# Patient Record
Sex: MALE | Race: WHITE | NOT HISPANIC OR LATINO | Employment: FULL TIME | URBAN - METROPOLITAN AREA
[De-identification: names, ages, dates, MRNs, and addresses within clinical notes are randomized per-mention and may not be internally consistent; named-entity substitution may affect disease eponyms.]

---

## 2024-01-19 NOTE — PATIENT INSTRUCTIONS
Health Maintenance Due   Topic Date Due    COVID-19 Vaccine (1) Never done    TDAP/TD VACCINES (1 - Tdap) Never done    INFLUENZA VACCINE  Never done    HEPATITIS C SCREENING  Never done    ANNUAL PHYSICAL  Never done    You are due for adacel Tdap vaccination. (provides protection against tetanus, diptheria and whooping cough) Please  get the immunization at your local pharmacy at your earliest convenience. This immunization will next be due in 10 years. Please click on the link for more information about this vaccine.    CDC Tdap Vaccine Information    12 hour fast for labs

## 2024-01-21 PROBLEM — K40.90 NON-RECURRENT UNILATERAL INGUINAL HERNIA WITHOUT OBSTRUCTION OR GANGRENE: Status: ACTIVE | Noted: 2024-01-21

## 2024-01-22 ENCOUNTER — OFFICE VISIT (OUTPATIENT)
Dept: FAMILY MEDICINE CLINIC | Facility: CLINIC | Age: 44
End: 2024-01-22
Payer: COMMERCIAL

## 2024-01-22 VITALS
HEART RATE: 65 BPM | SYSTOLIC BLOOD PRESSURE: 114 MMHG | WEIGHT: 157.4 LBS | TEMPERATURE: 98.7 F | BODY MASS INDEX: 22.04 KG/M2 | DIASTOLIC BLOOD PRESSURE: 75 MMHG | HEIGHT: 71 IN | OXYGEN SATURATION: 98 %

## 2024-01-22 DIAGNOSIS — E55.9 VITAMIN D DEFICIENCY: ICD-10-CM

## 2024-01-22 DIAGNOSIS — Z11.59 NEED FOR HEPATITIS C SCREENING TEST: ICD-10-CM

## 2024-01-22 DIAGNOSIS — Z00.01 ENCOUNTER FOR ANNUAL GENERAL MEDICAL EXAMINATION WITH ABNORMAL FINDINGS IN ADULT: Primary | ICD-10-CM

## 2024-01-22 DIAGNOSIS — Z11.4 SCREENING FOR HIV (HUMAN IMMUNODEFICIENCY VIRUS): ICD-10-CM

## 2024-01-22 DIAGNOSIS — Z13.220 SCREENING CHOLESTEROL LEVEL: ICD-10-CM

## 2024-01-22 DIAGNOSIS — Z13.1 SCREENING FOR DIABETES MELLITUS: ICD-10-CM

## 2024-01-22 DIAGNOSIS — K40.90 NON-RECURRENT UNILATERAL INGUINAL HERNIA WITHOUT OBSTRUCTION OR GANGRENE: ICD-10-CM

## 2024-01-22 PROBLEM — Z87.891 HISTORY OF SMOKING: Status: ACTIVE | Noted: 2024-01-22

## 2024-01-22 PROCEDURE — 99386 PREV VISIT NEW AGE 40-64: CPT | Performed by: PREVENTIVE MEDICINE

## 2024-01-22 PROCEDURE — 99213 OFFICE O/P EST LOW 20 MIN: CPT | Performed by: PREVENTIVE MEDICINE

## 2024-01-22 NOTE — PROGRESS NOTES
Subjective   David Vera is a 44 y.o. male presents today to establish care, wellness exam, screening for hepatitis C and HIV, nonrecurrent unilateral inguinal hernia without obstruction, vitamin D deficiency, screening cholesterol, and screening for diabetes.    Chief Complaint   Patient presents with    Establish [facility] [place]     Patient is not fasting.  Would like a flu shot today.   Has been about 20 years since patient has had a check up.    Annual Exam    Hernia       Health Maintenance Due   Topic Date Due    COVID-19 Vaccine (1) Never done    TDAP/TD VACCINES (1 - Tdap) Never done    INFLUENZA VACCINE  Never done    HEPATITIS C SCREENING  Never done    ANNUAL PHYSICAL  Never done   Patient presents today for his annual wellness exam and has been advised to wear sunscreen and a seatbelt.    He reports a hernia in his groin area that he noticed early in the summer of . Around New Year's [place] weekend 2023, he experienced pressure and discomfort. It has not prevented him from doing any activities.     He denies having any lung problems or asthma. He smoked in college for about 10 years and stopped smoking 12 years ago. Patient denies drinking alcohol or smoking tobacco. He does use cannabis occasionally.     His blood pressure is 120/79 mmHg in the right arm and 114/75 mmHg in the left arm. He is on his feet all day for work. All other vitals are within normal range.     The patient has no significant medical or surgical history.     He is due for his eye and dental exams.     He snores, but not excessively. He denies falling asleep during the daytime or while driving.     Patient reports occasional palpitations and normal sweating at night.     He denies ever having mononucleosis as a child. He has never been screened for hepatitis C.    He denies any medical problems. He denies any surgeries.    Patient manages a science lab.     His father  of a heart attack at 74. His brother has supraventricular  "tachycardia.     The patient has no known medication allergies.    He has received 2 doses of the COVID-19 vaccine and 1 booster. He is due for the influenza and pneumonia vaccine.     Vitals:    01/22/24 1416 01/22/24 1419   BP: 120/79 114/75   BP Location: Right arm Left arm   Patient Position: Sitting Sitting   Cuff Size: Adult Adult   Pulse: 64 65   Temp: 98.7 °F (37.1 °C)    TempSrc: Temporal    SpO2: 98%    Weight: 71.4 kg (157 lb 6.4 oz)    Height: 180.3 cm (71\")      Body mass index is 21.95 kg/m².    No current outpatient medications on file prior to visit.     No current facility-administered medications on file prior to visit.       The following portions of the patient's history were reviewed and updated as appropriate: allergies, current medications, past family history, past medical history, past social history, past surgical history, and problem list.    Review of Systems   Constitutional:  Negative for chills, diaphoresis, fever and unexpected weight loss.   HENT:  Negative for hearing loss, tinnitus and trouble swallowing.    Respiratory:  Negative for cough, shortness of breath and wheezing.    Cardiovascular:  Positive for palpitations. Negative for chest pain.   Gastrointestinal:  Negative for blood in stool, constipation, diarrhea, GERD and indigestion.   Genitourinary:  Negative for dysuria, hematuria and erectile dysfunction.   Neurological:  Negative for seizures, syncope and headache.         Objective   Physical Exam  HENT:      Right Ear: Tympanic membrane normal. There is impacted cerumen.      Left Ear: Tympanic membrane normal. There is impacted cerumen.      Nose: Nose normal.      Mouth/Throat:      Mouth: Mucous membranes are moist.      Pharynx: Oropharynx is clear.   Eyes:      Pupils: Pupils are equal, round, and reactive to light.   Neck:      Vascular: No carotid bruit.   Cardiovascular:      Rate and Rhythm: Normal rate and regular rhythm.      Heart sounds: No murmur " heard.  Pulmonary:      Effort: Pulmonary effort is normal.      Breath sounds: Normal breath sounds. No wheezing, rhonchi or rales.   Abdominal:      General: Abdomen is flat. Bowel sounds are normal. There is no distension.      Palpations: Abdomen is soft. There is no mass.      Tenderness: There is no abdominal tenderness.   Musculoskeletal:      Right lower leg: No edema.      Left lower leg: No edema.   Lymphadenopathy:      Cervical: No cervical adenopathy.       Patient had a 3 inch nontender left inguinal indirect hernia.  Cords were posterior there was no right inguinal hernia no femoral hernias present.  PHQ-9 Total Score: 0    Assessment & Plan   Diagnoses and all orders for this visit:    1. Encounter for annual general medical examination with abnormal findings in adult (Primary)  -     CBC Auto Differential    2. Need for hepatitis C screening test  -     Hepatitis C Antibody; Future    3. Screening for HIV (human immunodeficiency virus)  -     HIV-1 / O / 2 Ag / Antibody    4. Non-recurrent unilateral inguinal hernia without obstruction or gangrene  -     Ambulatory Referral to General Surgery    5. Vitamin D deficiency  -     Vitamin D,25-Hydroxy    6. Screening cholesterol level  -     Lipid Panel    7. Screening for diabetes mellitus  -     Comprehensive Metabolic Panel    Other orders  -     Cancel: Fluzone >6 Months ([id number])  -     Cancel: Pneumococcal Conjugate Vaccine 20-Valent (PCV20)          Patient Instructions     Health Maintenance Due   Topic Date Due    COVID-19 Vaccine (1) Never done    TDAP/TD VACCINES (1 - Tdap) Never done    INFLUENZA VACCINE  Never done    HEPATITIS C SCREENING  Never done    ANNUAL PHYSICAL  Never done    You are due for adacel Tdap vaccination. (provides protection against tetanus, diptheria and whooping cough) Please  get the immunization at your local pharmacy at your earliest convenience. This immunization will next be due in 10 years. Please click on the  link for more information about this vaccine.    [facility] Tdap Vaccine Information    12 hour fast for labs             Transcribed from ambient dictation for Aylin Carranza MD by Elizabeth Byrne.  01/22/24   15:39 EST    Patient or patient representative verbalized consent to the visit recording.  I have personally performed the services described in this document as transcribed by the above individual, and it is both accurate and complete.

## 2024-01-30 ENCOUNTER — OFFICE VISIT (OUTPATIENT)
Dept: SURGERY | Facility: CLINIC | Age: 44
End: 2024-01-30
Payer: COMMERCIAL

## 2024-01-30 VITALS
HEART RATE: 60 BPM | SYSTOLIC BLOOD PRESSURE: 120 MMHG | RESPIRATION RATE: 16 BRPM | DIASTOLIC BLOOD PRESSURE: 87 MMHG | BODY MASS INDEX: 21.65 KG/M2 | WEIGHT: 154.6 LBS | TEMPERATURE: 98.4 F | HEIGHT: 71 IN | OXYGEN SATURATION: 100 %

## 2024-01-30 DIAGNOSIS — K40.90 NON-RECURRENT UNILATERAL INGUINAL HERNIA WITHOUT OBSTRUCTION OR GANGRENE: Primary | ICD-10-CM

## 2024-01-30 PROCEDURE — 99204 OFFICE O/P NEW MOD 45 MIN: CPT | Performed by: SURGERY

## 2024-01-30 NOTE — PROGRESS NOTES
"Subjective   David Gonzalez is a 44 y.o. male.   Chief Complaint   Patient presents with    Hernia     New pt ref Dr. Carranza, Lt Ing Hernia      /87 (BP Location: Left arm, Patient Position: Sitting, Cuff Size: Adult)   Pulse 60   Temp 98.4 °F (36.9 °C) (Infrared)   Resp 16   Ht 180.3 cm (71\")   Wt 70.1 kg (154 lb 9.6 oz)   SpO2 100%   BMI 21.56 kg/m²     HISTORY OF PRESENT ILLNESS:  44-year-old gent referred over to me for evaluation and management of a left inguinal hernia.  He says that over the summer he started to notice a bulge in the left inguinal region.  Not until the last month or 2 to the start to have any symptoms.  The symptoms are mild there is not uneasiness when his bulging out is not necessarily painful is just a little bit more sensitive especially at times whenever it is bulging out more like after activity.  Never had surgery in this location before.      No outpatient encounter medications on file as of 1/30/2024.     No facility-administered encounter medications on file as of 1/30/2024.     History reviewed. No pertinent past medical history.  History reviewed. No pertinent surgical history.  Family History   Problem Relation Age of Onset    Heart attack Father     Supraventricular tachycardia Brother        Social History     Tobacco Use    Smoking status: Former     Packs/day: 0.50     Years: 11.00     Additional pack years: 0.00     Total pack years: 5.50     Types: Cigarettes     Start date: 1/1/2002     Quit date: 4/1/2013     Years since quitting: 10.8     Passive exposure: Past    Smokeless tobacco: Never    Tobacco comments:     Quit cold turkey april 2013   Vaping Use    Vaping Use: Never used   Substance Use Topics    Alcohol use: Not Currently     Comment: Heavy drinker from 2001 to 2009    Drug use: Yes     Frequency: 5.0 times per week     Types: Marijuana     Comment: Enjoy small amounts at night     Patient has no known allergies.    Review of Systems  Complete " review of systems has been obtained and is negative.  Objective     Physical Exam  Constitutional:       Appearance: Normal appearance.   HENT:      Head: Normocephalic and atraumatic.   Cardiovascular:      Rate and Rhythm: Normal rate.   Pulmonary:      Effort: Pulmonary effort is normal. No respiratory distress.   Abdominal:      General: There is no distension.      Palpations: Abdomen is soft.      Comments: In the left inguinal canal there is some bulging with about soft that is immediately reducible there is no overlying skin changes   Neurological:      General: No focal deficit present.      Mental Status: He is alert. Mental status is at baseline.   Psychiatric:         Mood and Affect: Mood normal.         Behavior: Behavior normal.           Assessment & Plan   Diagnoses and all orders for this visit:    1. Non-recurrent unilateral inguinal hernia without obstruction or gangrene (Primary)    Counseled him about what is likely a left direct inguinal hernia.  Talked about the natural history of these hernias the risk of watchful waiting and when to seek immediate medical attention and the risk of the operation we talked about open and laparoscopic robotic techniques.  We talked about the risk the benefits of the use of mesh.  Talked about infection bleeding mesh complications like pain and recurrence.  Talked about incidental injury.  After discussion about the risk and benefits of the surgery we are likely going to be moving forward with the robotic left inguinal hernia repair however he would like to talk to his family little bit about timing so this can be give me a call to schedule.    Bishnu Ramos MD  1/30/2024  9:08 AM EST    This note was created using Dragon Voice Recognition software.

## 2024-02-02 ENCOUNTER — PATIENT ROUNDING (BHMG ONLY) (OUTPATIENT)
Dept: FAMILY MEDICINE CLINIC | Facility: CLINIC | Age: 44
End: 2024-02-02
Payer: COMMERCIAL

## 2024-02-02 NOTE — PROGRESS NOTES
A [employer] message has been sent to the patient for patient [place] [facility]

## 2024-04-01 ENCOUNTER — E-VISIT (OUTPATIENT)
Dept: FAMILY MEDICINE CLINIC | Facility: TELEHEALTH | Age: 44
End: 2024-04-01
Payer: COMMERCIAL

## 2024-04-01 PROCEDURE — BRIGHTMDVISIT: Performed by: NURSE PRACTITIONER

## 2024-04-01 NOTE — E-VISIT TREATED
Chief Complaint: Anxiety, Depression, Stress   Patient introduction   Patient is 44-year-old male presenting with mood symptoms. Patient has had current symptoms for more than a year. Has had recent unusual stress relating to personal relationships, home situation, family functioning, work, and finances.   Depression screening   PHQ-9. Response options are: Not at all (0), On several days (1), More than half the days (2), or Nearly every day (3).   Over the past 2 weeks, patient has been bothered:    (3) Nearly every day by having little interest or pleasure in doing things    (3) Nearly every day by depressed mood    (1) On several days by sleep disturbance    (0) Not at all by fatigue or lethargy    (3) Nearly every day by change in appetite    (3) Nearly every day by feelings of worthlessness or excessive guilt    (1) On several days by poor concentration    (0) Not at all by observable restlessness or slowness in movement    (1) On several days by thoughts of hurting themselves or that they would be better off dead   The above problems have made it very difficult to work, function at home, or get along with other people.   Score: 15. Interpretation: 0 to 4: None to minimal. 5 to 9: Mild depression. 10 to 14: Major Depressive Disorder, Mild. 15 to 19: Major Depressive Disorder, Moderately Severe. 20 to 27: Major Depressive Disorder, Severe.   Anxiety screening   TRISHA-7. Response options are: Not at all (0), On several days (1), More than half the days (2), or Nearly every day (3)   Over the past 2 weeks, patient has been bothered:    (1) On several days by feeling nervous, anxious, or on edge    (3) Nearly every day by not being able to stop or control worrying    (3) Nearly every day by worrying too much about different things    (3) Nearly every day by having trouble relaxing    (0) Not at all by being so restless that it is hard to sit still    (3) Nearly every day by becoming easily annoyed or irritable     (3) Nearly every day by feeling afraid, as if something awful might happen   The above problems have made it very difficult to work, function at home, or get along with other people.   Score: 16. Interpretation: 0 to 4: None to minimal. 5 to 9: Mild anxiety. 10 to 14: Moderate anxiety. 15 to 21: Severe anxiety.   Suicide risk screening   Score: Negative screen (based on PHQ-9 responses above).   Action taken based on risk:    Negative screen: Patient completed interview.    Low risk: Patient completed interview. Follow-up per provider discretion.    Moderate risk: Recommended to call [phone] or [phone] or to go to their nearest ER. Patient given option to continue with the interview if those options are not relevant at this time. Follow-up per provider discretion.    High risk: Interview terminated. Recommended to go to ER or to call [phone] or [phone].   Repetitive thoughts and behaviors screening   DSM-5 Level 1 Cross-Cutting Symptom Measure, Section X. 2 items. Response options are: Not at all (0), Rarely (1), Several days (2), More than half the days (3), or Nearly every day (4)   Over the past 2 weeks, patient has been bothered:    (3) On more than half the days by unpleasant thoughts, urges, or images that repeatedly enter their mind    (2) On several days by feeling driven to repeat certain behaviors or mental acts   Score: 5. Interpretation: 0 to 2 (with 0 to 1 on both items): Negative screen. 2 or higher (with 2 or higher on either item): Positive screen.   Lian/hypomania screening   DSM-5 Level 1 Cross-Cutting Symptom Measure, Section III. 2 items. Response options are: Not at all (0), Rarely (1), Several days (2), More than half the days (3), or Nearly every day (4)   Over the past 2 weeks, patient has been bothered:    (3) On more than half the days by sleeping less than usual, but still having a lot of energy    (0) Not at all by starting lots more projects than usual or doing more risky things than usual   Score: 3.  Interpretation: 0 to 2 (with 1 on both items): Negative screen. 2 or higher (with 2 or higher on at least 1 item): Positive screen; in-interview follow-up with Cullen Self-Rating Lian (ASRM) Scale.   [place]ullen Self-Rating Lian (ASRM) Scale. 5 items in which patient chooses the statement that best describes how they have been feeling over the past week.   Patient responses:    (0) I do not feel happier or more cheerful than usual    (1) I occasionally feel more self-confident than usual    (2) I often need less sleep than usual    (0) I do not talk more than usual    (4) I am constantly active or on the go all the time   Score: 7. Interpretation: A score of 6 or higher indicates a high probability of a manic or hypomanic condition and may indicate a need for treatment and/or further diagnostic workup. A score of 5 or lower is less likely to be associated with significant symptoms of lian.   Psychosis/hallucination screening   DSM-5 Level 1 Cross-Cutting Symptom Measure, Section VII. 2 items. Response options are: Not at all (0), Rarely (1), Several days (2), More than half the days (3), or Nearly every day (4)   Over the past 2 weeks, patient has been bothered:    (0) Not at all by hearing things other people could not hear    (0) Not at all by feeling that someone could hear their thoughts   Score: 0. Interpretation: 0: Negative screen. 1 or higher: Positive screen.   Substance abuse screening   DSM-5 Level 1 Cross-Cutting Symptom Measure, Section XIII. 2 items on use of tobacco, recreational drugs, or prescription medications beyond the amount prescribed or duration of prescription.   Over the past 2 weeks, patient:    (0) Did not use tobacco    (4) Used a recreational or prescription drug on their own nearly every day   Score: 4. Interpretation: 0 is a negative screen. 1 or higher with positive response for prescription/recreational drug abuse leads to follow-up with Level 2 Cross-Cutting Symptom Measure, Section  XIII. 1 or higher with positive response for tobacco use leads to tobacco cessation advice in AVS.   DSM-5 Level 2 Cross-Cutting Symptom Measure, Section XIII. 1 item per positive response to substance use on Level 1 screening above.   Over the past 2 weeks, patient:    (4) Used marijuana nearly every day   Score: 4. Interpretation: Scores on the individual items should be interpreted independently. Positive responses on multiple items indicates greater severity and complexity of substance use.   AUDIT-C. 3 items, shown if alcohol use reported above.   During the past year, patient:    (1) Had an alcoholic drink monthly or less    (0) Had 1 to 2 alcoholic drinks on a typical day when they were drinking    (0) Did not have 6 or more drinks on one occasion   Score: 1. Interpretation: A score of 4 or higher in men is a positive screen for unhealthy drinking.   Past mental health history   Previous diagnosis of depression and generalized anxiety disorder. Regarding month and year of first depression diagnosis, patient writes: 04/2012. Since initial depression diagnosis, patient has had a period when symptoms resolved and they did not need medication.   Family history of mental health disorders   First-degree relative(s) with a history of depression, generalized anxiety disorder, and panic attacks. Regarding medication taken by first-degree relative(s), patient writes: Not know. .   Current mental health treatment   Patient is not currently taking medication for any mental health condition. Patient is not currently in counseling or therapy. Patient is not currently being seen by a psychiatrist and has not been seen by one in the last 2 years.   Previous mental health treatment   Has taken escitalopram, fluoxetine, bupropion, and sertraline in the past.   As to effectiveness of past treatment:   Patient was not satisfied with bupropion. They felt it helped some, but symptoms were still present. Patient does not want to  "refill bupropion.   Patient was not satisfied with escitalopram. They felt it helped some, but symptoms were still present. Patient wants to refill escitalopram.   Patient was not satisfied with fluoxetine. They felt it helped some, but symptoms were still present. Patient does not want to refill fluoxetine.   Patient was not satisfied with sertraline. They felt it helped some, but symptoms were still present. Patient does not want to refill sertraline.   Regarding past medication(s) for mental health condition(s), patient writes: alprazolam.   Vital signs    Height: 5' 10\"    Weight: 157 lbs   Current medications   Not taking other medications or supplements.   Medication allergies   None.   Medication contraindications   Past medical history   No history of asthma, cancer, chronic pain, congestive heart failure, coronary artery disease, diabetes, epilepsy, hypertension, inflammatory arthritis, kidney disease or history of kidney function problems, lupus, multiple sclerosis, Parkinson disease, thyroid disorder, or viral hepatitis.   Patient-submitted comments   Patient was asked if they had anything to add about their symptoms. Patient writes: For acute anxiety, Xanax has been effective. My depression is worse today than it was 12 years ago. I wanted to believe this is a mid life crisis but my depression and anger might be ruining my family. I am open to in-person. .   Patient is willing to try medication as part of their treatment plan.   Patient did not request an excuse note.   Assessment   Major depressive disorder, recurrent, moderate.   This diagnosis is based on review of patient interview responses and other available clinical information.    PHQ-9 depression screening score: 15. Interpretation: 15 to 19: Major Depressive Disorder, Moderately Severe.    TRISHA-7 generalized anxiety screening score: 16. Interpretation: 15 to 21: Severe anxiety.   Suicide risk severity screening was negative.   Based on " screening tests, the following areas warrant further evaluation at follow-up:    Substance use    Repetitive thoughts and behaviors    Lian/hypomania   Plan   Medications:   No medications prescribed.   Orders:    Referral to behavioral health. Additional note: Referral to  [facility] for virtual  for medication management. (urgent) Moraima JEAN   Education:    Condition and causes    Treatment and self-care    Possible medication side effects    When to call provider   ----------   Electronically signed by MIRANDA Paiz on 2024-04-01 at 14:36PM   ----------   Patient Interview Transcript:   Have you ever been diagnosed with any of these mental health conditions? Select all that apply.    Depression    Generalized anxiety disorder (TRISHA)   Not selected:    Panic attacks    Post traumatic stress disorder (PTSD)    Obsessive-compulsive disorder (OCD)    Bipolar disorder    Schizophrenia or schizoaffective disorder    A mental health condition not listed here (specify)    None of the above   When were you first diagnosed with depression? Please specify the month and year, or your best estimate, as MM/YYYY.    04/2012   Since you were first diagnosed with depression, has there been a time when your symptoms went away completely and you didn't need to take medication? Select one.    Yes   Not selected:    No   Are you currently taking medication for any mental health condition? Select one.    No   Not selected:    Yes   Have you taken medication for any mental health condition in the past? Select one.    Yes   Not selected:    No   Which medications have you taken in the past for your mental health condition(s)? Select all that apply.    Lexapro (escitalopram)    Prozac (fluoxetine)    Wellbutrin SR, Wellbutrin XL, Forfivo XL, or Aplenzin (bupropion)    Zoloft (sertraline)    Other (specify medication and whether you were satisfied with it): alprazolam   Not selected:    Atarax or Vistaril (hydroxyzine)     BuSpar (buspirone)    Celexa (citalopram)    Cymbalta or Drizalma Sprinkle (duloxetine)    Effexor or Effexor XR (venlafaxine)    Paxil, Paxil CR, or Pexeva (paroxetine)    Pristiq (desvenlafaxine)    Remeron (mirtazapine)    Trazodone   I'm satisfied with Prozac (fluoxetine)    No   Not selected:    Yes   I want to refill/restart    No   Not selected:    Yes   Why were you unsatisfied with Prozac (fluoxetine)? Select all that apply.    It helps some, but I still have bothersome symptoms   Not selected:    It doesn't help my symptoms at all    I don't like the side effects    It's too expensive    None of the above   I'm satisfied with Zoloft (sertraline)    No   Not selected:    Yes   I want to refill/restart    No   Not selected:    Yes   Why were you unsatisfied with Zoloft (sertraline)? Select all that apply.    It helps some, but I still have bothersome symptoms   Not selected:    It doesn't help my symptoms at all    I don't like the side effects    It's too expensive    None of the above   I'm satisfied with Lexapro (escitalopram)    No   Not selected:    Yes   I want to refill/restart    Yes   Not selected:    No   Why were you unsatisfied with Lexapro (escitalopram)? Select all that apply.    It helps some, but I still have bothersome symptoms   Not selected:    It doesn't help my symptoms at all    I don't like the side effects    It's too expensive    None of the above   I'm satisfied with Wellbutrin SR, Wellbutrin XL, Forfivo XL, or Aplenzin    No   Not selected:    Yes   I want to refill/restart    No   Not selected:    Yes   Why were you unsatisfied with Wellbutrin SR, Wellbutrin XL, Forfivo XL, or Aplenzin (bupropion)? Select all that apply.    It helps some, but I still have bothersome symptoms   Not selected:    It doesn't help my symptoms at all    I don't like the side effects    It's too expensive    None of the above   Have you recently experienced unusual stress from any of these? Select all  that apply.    Personal relationships    Home situation    Family    Work    Finances   Not selected:    Current news and events    None of the above   Are you currently in counseling or therapy? Select one.    No   Not selected:    Yes   Are you currently being seen by a psychiatrist, or have you been seen by a psychiatrist in the last 2 years? Select one.    No   Not selected:    Yes, currently    Yes, within the last 2 years   Do you have any of these medical conditions? Scroll to see all options. Select all that apply.    None of the above   Not selected:    Asthma    Cancer    Chronic pain    Congestive heart failure    Coronary artery disease (blocked arteries in the heart)    Diabetes    Epilepsy    High blood pressure    Inflammatory arthritis    Kidney disease or history of kidney function problems    Lupus (SLE)    Multiple sclerosis    Parkinson disease    Thyroid disorder    Viral hepatitis   Do any of these apply to your first-degree blood relatives? First-degree blood relatives include parents, siblings, and children who you're related to by birth, not by marriage or adoption. Select all that apply.    Depression    Generalized anxiety disorder (TRISHA)    Panic attacks   Not selected:    Post traumatic stress disorder (PTSD)    Obsessive-compulsive disorder (OCD)    Bipolar disorder    Schizophrenia or schizoaffective disorder    Drug or alcohol addiction (substance use disorder)     by suicide    Attempted suicide    No, not that I know of   Are any of your first-degree blood relatives taking medications for their condition? Select one.    Yes   Not selected:    No, not that I know of   Genetics often play a role in how well medications work for mental health conditions. For example, if your sister with depression did well on sertraline (Zoloft), then it's likely that sertraline would work well for you, too. If you know the name of the medication your family member takes for their mental health  condition, list it here. If not, click Next.    __Not know. __   1. Over the past 2 weeks, how often have you been bothered by: Having little interest or pleasure in doing things Select one.    Nearly every day   Not selected:    Not at all    Several days    More than half the days   2. Over the past 2 weeks, how often have you been bothered by: Feeling down, depressed, or hopeless Select one.    Nearly every day   Not selected:    Not at all    Several days    More than half the days   3. Over the past 2 weeks, how often have you been bothered by: Trouble falling or staying asleep, or sleeping too much Select one.    Several days   Not selected:    Not at all    More than half the days    Nearly every day   4. Over the past 2 weeks, how often have you been bothered by: Feeling tired or having little energy Select one.    Not at all   Not selected:    Several days    More than half the days    Nearly every day   5. Over the past 2 weeks, how often have you been bothered by: Poor appetite or overeating Select one.    Nearly every day   Not selected:    Not at all    Several days    More than half the days   6. Over the past 2 weeks, how often have you been bothered by: Feeling bad about yourself, that you're a failure, or that you've let yourself or friends and family down Select one.    Nearly every day   Not selected:    Not at all    Several days    More than half the days   7. Over the past 2 weeks, how often have you been bothered by: Trouble concentrating on things like watching TV or reading the news Select one.    Several days   Not selected:    Not at all    More than half the days    Nearly every day   8. Over the past 2 weeks, how often have you been bothered by: Moving or speaking so slowly that other people could have noticed OR Being so fidgety or restless that you have been moving around a lot more than usual Select one.    Not at all   Not selected:    Several days    More than half the days     Nearly every day   9. Over the past 2 weeks, how often have you been bothered by: Thoughts that you'd be better off dead or thoughts of hurting yourself Select one.    Several days   Not selected:    Not at all    More than half the days    Nearly every day   How difficult have these problems made it for you to work, take care of things at home, or get along with other people? Select one.    Very difficult   Not selected:    Not difficult at all    Somewhat difficult    Extremely difficult   1. Over the past 2 weeks, how often have you been bothered by: Feeling nervous, anxious, or on edge? Select one.    Several days   Not selected:    Not at all    More than half the days    Nearly every day   2. Over the past 2 weeks, how often have you been bothered by: Not being able to stop or control worrying? Select one.    Nearly every day   Not selected:    Not at all    Several days    More than half the days   3. Over the past 2 weeks, how often have you been bothered by: Worrying too much about different things? Select one.    Nearly every day   Not selected:    Not at all    Several days    More than half the days   4. Over the past 2 weeks, how often have you been bothered by: Having trouble relaxing? Select one.    Nearly every day   Not selected:    Not at all    Several days    More than half the days   5. Over the past 2 weeks, how often have you been bothered by: Being so restless that it's hard to sit still? Select one.    Not at all   Not selected:    Several days    More than half the days    Nearly every day   6. Over the past 2 weeks, how often have you been bothered by: Becoming easily annoyed or irritable? Select one.    Nearly every day   Not selected:    Not at all    Several days    More than half the days   7. Over the past 2 weeks, how often have you been bothered by: Feeling afraid, as if something awful might happen? Select one.    Nearly every day   Not selected:    Not at all    Several days    " More than half the days   How difficult have these symptoms made it for you to do your work, take care of things at home, or get along with other people? Select one.    Very difficult   Not selected:    Not difficult at all    Somewhat difficult    Extremely difficult   Over the past 2 weeks, how often have you been bothered by: Sleeping less than usual, but still having a lot of energy? Select one.    More than half the days   Not selected:    Not at all    1 to 2 days    Several days    Nearly every day   Over the past 2 weeks, how often have you been bothered by: Starting lots more projects than usual or doing more risky things than usual? Select one.    Not at all   Not selected:    1 to 2 days    Several days    More than half the days    Nearly every day   Which statement best describes how you've been feeling over the past week? \"Occasionally\" here means once or twice, and \"often\" means several times or more. Select one.    I don't feel happier or more cheerful than usual   Not selected:    I occasionally feel happier or more cheerful than usual    I often feel happier or more cheerful than usual    I feel happier or more cheerful than usual most of the time    I feel happier or more cheerful than usual all of the time   Which statement best describes how you've been feeling over the past week? \"Occasionally\" here means once or twice, and \"often\" means several times or more. Select one.    I occasionally feel more self-confident than usual   Not selected:    I don't feel more self-confident than usual    I often feel more self-confident than usual    I feel more self-confident than usual most of the time    I feel extremely self-confident all of the time   Which statement best describes how you've been feeling over the past week? \"Occasionally\" here means once or twice, and \"often\" means several times or more. Select one.    I often need less sleep than usual   Not selected:    I don't need less sleep than " "usual    I occasionally need less sleep than usual    I need less sleep than usual most of the time    I can go all day and all night without any sleep and still not feel tired   Which statement best describes how you've been feeling over the past week? \"Occasionally\" here means once or twice, and \"often\" means several times or more. Select one.    I don't talk more than usual   Not selected:    I occasionally talk more than usual    I often talk more than usual    I talk more than usual most of the time    I talk constantly and can't be interrupted   Which statement best describes how you've been feeling over the past week? \"Occasionally\" here means once or twice, and \"often\" means several times or more. By \"active,\" we mean socially, sexually, or at work, home, or school. Select one.    I am constantly active or on the go all the time   Not selected:    I haven't been more active than usual    I have occasionally been more active than usual    I have often been more active than usual    I have been more active than usual most of the time   Over the past 2 weeks, how often have you been bothered by: Hearing things other people couldn't hear, such as voices even when no one was around? Select one.    Not at all   Not selected:    1 to 2 days    Several days    More than half the days    Nearly every day   Over the past 2 weeks, how often have you been bothered by: Feeling that someone could hear your thoughts, or that you could hear what another person was thinking? Select one.    Not at all   Not selected:    1 to 2 days    Several days    More than half the days    Nearly every day   Over the past 2 weeks, how often have you been bothered by: Unpleasant thoughts, urges, or images that repeatedly enter your mind? Select one.    More than half the days   Not selected:    Not at all    1 to 2 days    Several days    Nearly every day   Over the past 2 weeks, how often have you been bothered by: Feeling driven to " "perform certain behaviors or mental acts over and over again? Select one.    Several days   Not selected:    Not at all    1 to 2 days    More than half the days    Nearly every day   In the past year, how often did you have a drink containing alcohol? Select one.    Monthly or less   Not selected:    Never    _2 to 4 times per month _    2 to 3 times per week    4 or more times per week   In the past year, how many drinks containing alcohol did you have on a typical day when you were drinking? Select one.    1 or 2   Not selected:    3 or 4    5 or 6    7 to 9    10 or more   In the past year, how often did you have 6 or more drinks on one occasion? Select one.    Never   Not selected:    Less than monthly    Monthly    Weekly    Daily or almost daily   Over the past 2 weeks, how often have you: Smoked any cigarettes, smoked a cigar or pipe, or used snuff or chewing tobacco? Select one.    Not at all   Not selected:    1 to 2 days    Several days    More than half the days    Nearly every day   Over the past 2 weeks, how often did you use any of these on your own? \"On your own\" means without a doctor's prescription, or more than prescribed, or longer than prescribed. - Prescription painkillers, such as Vicodin - Stimulants, such as Ritalin or Adderall - Sedatives or tranquilizers, such as sleeping pills or Valium - Marijuana - Cocaine or crack - Club drugs, such as Ecstasy - Hallucinogens, such as LSD - Heroin - Inhalants or solvents, such as glue - Methamphetamines, such as speed Select one.    Nearly every day   Not selected:    Not at all    1 to 2 days    Several days    More than half the days   Over the past 2 weeks, which of these did you use on your own? Select all that apply.    Marijuana   Not selected:    Prescription painkillers, such as Vicodin    Stimulants, such as Ritalin or Adderall    Sedatives or tranquilizers, such as sleeping pills or Valium    Cocaine or crack    Club drugs, such as Ecstasy   " " Hallucinogens, such as LSD    Heroin    Inhalants or solvents, such as glue    Methamphetamines, such as speed   Over the past 2 weeks, how often did you use marijuana? Select one.    Nearly every day   Not selected:    1 to 2 days    Several days    More than half the days   Think about all of the symptoms you've shared with us today. How long have you been feeling this way? Select one.    More than a year   Not selected:    Less than a year    I'm not sure   These last few questions help us make sure your treatment plan is safe for you. Do you have any of these conditions? Select all that apply.    None of these   Not selected:    Uncorrected or persistent electrolyte abnormalities, such as potassium, sodium, calcium or magnesium    QT prolongation    Congenital long QT syndrome (LQTS)    Ventricular arrhythmias, such as ventricular fibrillation or ventricular tachycardia    Bradycardia (low heart rate)    Recent heart attack    Congestive heart failure (CHF)   Do any of these apply to you now or in the recent past? \"Cold turkey\" here means stopping a medication suddenly rather than slowly taking lower and lower doses until you're off the medication. Select all that apply.    None of these   Not selected:    Seizure disorder    Bulimia or anorexia    Liver disease    Stopped using alcohol \"cold turkey\"    Stopped using a sedative \"cold turkey\"    Stopped using an anti-seizure drug \"cold turkey\"    Stopped using a benzodiazepine drug (Klonopin, Valium, Ativan, Xanax) \"cold turkey\"   Do any of the following apply to you? Select all that apply.    None of these   Not selected:    I'm currently taking pimozide    I'm currently taking thioridazine    I've taken an MAO inhibitor in the last 14 days    I've taken linezolid or IV methylene blue in the last 14 days   Are you taking any other medications, vitamins, or supplements? Select one.    No   Not selected:    Yes   Have you ever had an allergic or bad reaction to " any medication? Select one.    No   Not selected:    Yes   If medication is recommended as part of your treatment plan, is that something you're willing to try? Select one.    Yes   Not selected:    No   Knowing your Body Mass Index (BMI) can help your provider choose the best medication for you. To determine your BMI, we need to know your height and weight. Enter your height.    Height   Enter your weight (in pounds).    Weight   Do you need a doctor's note? A doctor's note confirms that you received care today and states when you can return to school or work. It does not contain information about your diagnosis or treatment plan. Your provider will make the final decision on whether to give you a doctor's note. Doctor's notes CANNOT be backdated. Select one.    No   Not selected:    Today only (1 day)    Today and tomorrow (2 days)    3 days   Is there anything you'd like to add about your symptoms? Please limit your comments to the symptoms covered in this interview. If you include comments about other concerns, your provider may recommend that you be seen in person.    __For acute anxiety, Xanax has been effective. My depression is worse today than it was 12 years ago. I wanted to believe this is a mid life crisis but my depression and anger might be ruining my family. I am open to in-person. __   ----------   Medical history   Medical history data does not currently exist for this patient.

## 2024-04-01 NOTE — EXTERNAL PATIENT INSTRUCTIONS
Note   Mr. Gonzalez, I have made a referral for you to be seen by [facility]. This appointment will be made for you by a referral employee at [facility] as soon as possible. Thank you for reaching out to [facility] for your mental health care. Best regards, Moraima Barnes APRADA   Diagnosis   Depression   My name is Moraima Barnes APRADA. I'm a healthcare provider at [facility]. I've reviewed your interview, and I see that you have some common symptoms of depression. I'm glad you reached out.   Depression is the most common mental health condition worldwide. In the [place], about 1 in 5 people will experience clinical depression in their lifetime. Fortunately, effective treatments are available. The sooner you start treatment, the better it works.   Treatment for depression can include counseling, coaching, consultations, antidepressant medications, or various digital tools. In creating your treatment plan, I've considered your symptoms, current situation, medical history, and previous treatments, if any.    Please follow up with your provider in a few weeks. They'll check how you're doing and adjust your treatment plan if necessary.   In addition to your prescribed treatment, there are things you can do to help yourself feel better. Depression can lead you to avoid doing tasks, activities, and being with others. Taking action can help break the cycle of avoidance. Even small actions and lifestyle changes can make a big difference. Try some of the suggestions in the Other treatment section below.   Orders and referrals   I've included a referral for therapy in your treatment plan. Someone will contact you to schedule an appointment for counseling or therapy.   About your diagnosis   Depression is different from ordinary sadness. When you're sad or going through normal grief, the feelings may come and go, and then fade over time. Depression causes long-standing symptoms that affect your ability to go  about your daily life.   It's a health condition that affects how you think and function, and can even affect your self-esteem. Sometimes depression can also cause physical symptoms such as headaches and stomach pains.   Common symptoms of depression include:    Feeling sad, hopeless, discouraged, or down    Loss of interest or pleasure in previously enjoyable activities    Appetite or weight changes    Sleep disturbances: sleeping too much or too little    Either restlessness or sluggishness    Loss of energy    Excessive guilt    Feelings of worthlessness    Difficulty concentrating    Recurrent thoughts of death or suicide   What to expect   Counseling and talk therapy   Counseling or therapy teaches you new coping skills and more adaptive ways of thinking about problems. These tools can help you make positive changes. The benefits of counseling often last long after treatment sessions have stopped.   Many people with mild symptoms of depression don't need medication, and can be treated with counseling, coaching, or other types of care management.   When to seek care   If you feel like harming yourself or others, call 911 right away.   The [employer] Suicide and Crisis Lifeline is also available. You can call 988   to speak with a counselor at the lifeline, or you can connect with one using their online chat  .   Call us at [phone]   with any sudden or unexpected symptoms.    Worsening depression symptoms    New or worsening anxiety symptoms    Feeling extremely agitated or restless    Panic attacks    Worsening insomnia    New or worsening irritability    Inappropriate aggression, anger, or violence    Dangerous impulses    Extreme increase in activity or talking    Other unusual changes in behavior, mood, thoughts, or feeling   Other treatment   The tips below may help you feel better while you start your treatment plan:   Self-care    Depression can make self-care hard, but taking action can help you get  "better. So start where you are and set small goals. These can be simple: get out of bed, take a shower, get dressed, prepare a meal.    Make a list of activities that usually improve your mood. When you're feeling down, try doing one of those activities, even for a few minutes.    Be kind to yourself. Don't get down on yourself if you don't reach a goal. Be willing to try again.    Try to eat on a regular schedule. Blood sugar levels can affect mood.   Exercise    Physical exercise has an especially positive effect on mood. If you're able to, try walking 30 minutes a day, 3 to 5 times a week. If that sounds like too much, challenge yourself to start walking for just 10 minutes a day. If walking is not for you, find another activity. Any kind of physical activity helps. The best exercise is the kind you enjoy and will actually do.   Improve your sleep   Getting better sleep is one of the best things you can do to improve your symptoms.    Caffeine, tobacco, and alcohol can cause interrupted sleep. Cutting down or quitting these can improve the quality of your sleep. If you can't quit caffeine completely, try avoiding it later in the day.    Set a regular bedtime, and allow a period of time to \"unwind\" before going to sleep.    Wake up at the same time every day.    Turn off or put away all electronic devices an hour before going to sleep.    Avoid reading, watching TV, or using electronic devices in bed.    As much as possible, keep your bedroom dark, cool, and quiet.    If you're struggling to sleep, don't stay in bed. Get up and go to a quiet spot. Read or do relaxation exercises. Then go back to bed and try again.   Try mindfulness exercises    If your mind races, focus on your body instead. Breathe in slowly through your nose and out through your mouth.    Some people find that meditation helps with mood symptoms. If you want to try meditation but don't know how, mobile apps can get you started.   Use your " creativity    When you have depression, you can often spend too much time thinking. Making something with your hands can use your thoughts in a positive way and bring some relief. It also helps you move from inaction to action. Activities like writing in a journal, gardening, woodworking, cooking, or doing a craft can help focus your mind.   Connect with others    If you can't meet in person, send a short text or email to someone just to keep in touch.    If you use social media, notice how it makes you feel. If certain topics or people have a negative effect on your mood, unfollow them. Limit the time you spend on social media. Active participation can be better than passive scrolling through a feed.    If you're up to it, try volunteering. Or just do something kind for someone. This can lift your mood as well as theirs.   Your provider   Your diagnosis was provided by MIRANDA Paiz, a member of your trusted care team at [facility].   If you have any questions, call us at [phone]  .

## 2024-06-28 ENCOUNTER — TELEPHONE (OUTPATIENT)
Dept: FAMILY MEDICINE CLINIC | Facility: CLINIC | Age: 44
End: 2024-06-28
Payer: COMMERCIAL

## 2024-09-09 ENCOUNTER — TELEPHONE (OUTPATIENT)
Dept: FAMILY MEDICINE CLINIC | Facility: CLINIC | Age: 44
End: 2024-09-09
Payer: COMMERCIAL

## 2024-09-09 NOTE — TELEPHONE ENCOUNTER
Caller: David Gonzalez    Relationship to patient: Self    Best call back number: [phone]     Type of visit: LABS    Requested date: 09-     Additional notes: PATIENT IS REQUESTING TO SCHEDULE LABS, PLEASE CALL TO SCHEDULE

## 2024-09-12 ENCOUNTER — LAB (OUTPATIENT)
Dept: FAMILY MEDICINE CLINIC | Facility: CLINIC | Age: 44
End: 2024-09-12
Payer: COMMERCIAL

## 2024-09-12 LAB
25(OH)D3 SERPL-MCNC: 32 NG/ML (ref 30–100)
ALBUMIN SERPL-MCNC: 4.5 G/DL (ref 3.5–5.2)
ALBUMIN/GLOB SERPL: 1.8 G/DL
ALP SERPL-CCNC: 72 U/L (ref 39–117)
ALT SERPL W P-5'-P-CCNC: 24 U/L (ref 1–41)
ANION GAP SERPL CALCULATED.3IONS-SCNC: 9.4 MMOL/L (ref 5–15)
AST SERPL-CCNC: 23 U/L (ref 1–40)
BASOPHILS # BLD AUTO: 0.06 10*3/MM3 (ref 0–0.2)
BASOPHILS NFR BLD AUTO: 1 % (ref 0–1.5)
BILIRUB SERPL-MCNC: 0.5 MG/DL (ref 0–1.2)
BUN SERPL-MCNC: 13 MG/DL (ref 6–20)
BUN/CREAT SERPL: 11.4 (ref 7–25)
CALCIUM SPEC-SCNC: 9.3 MG/DL (ref 8.6–10.5)
CHLORIDE SERPL-SCNC: 101 MMOL/L (ref 98–107)
CHOLEST SERPL-MCNC: 154 MG/DL (ref 0–200)
CO2 SERPL-SCNC: 26.6 MMOL/L (ref 22–29)
CREAT SERPL-MCNC: 1.14 MG/DL (ref 0.76–1.27)
DEPRECATED RDW RBC AUTO: 39.8 FL (ref 37–54)
EGFRCR SERPLBLD CKD-EPI 2021: 81.3 ML/MIN/1.73
EOSINOPHIL # BLD AUTO: 0.29 10*3/MM3 (ref 0–0.4)
EOSINOPHIL NFR BLD AUTO: 4.7 % (ref 0.3–6.2)
ERYTHROCYTE [DISTWIDTH] IN BLOOD BY AUTOMATED COUNT: 12 % (ref 12.3–15.4)
GLOBULIN UR ELPH-MCNC: 2.5 GM/DL
GLUCOSE SERPL-MCNC: 92 MG/DL (ref 65–99)
HCT VFR BLD AUTO: 47.7 % (ref 37.5–51)
HDLC SERPL-MCNC: 43 MG/DL (ref 40–60)
HGB BLD-MCNC: 15.8 G/DL (ref 13–17.7)
HIV 1+2 AB+HIV1 P24 AG SERPL QL IA: NORMAL
IMM GRANULOCYTES # BLD AUTO: 0.01 10*3/MM3 (ref 0–0.05)
IMM GRANULOCYTES NFR BLD AUTO: 0.2 % (ref 0–0.5)
LDLC SERPL CALC-MCNC: 96 MG/DL (ref 0–100)
LDLC/HDLC SERPL: 2.22 {RATIO}
LYMPHOCYTES # BLD AUTO: 1.82 10*3/MM3 (ref 0.7–3.1)
LYMPHOCYTES NFR BLD AUTO: 29.4 % (ref 19.6–45.3)
MCH RBC QN AUTO: 30.4 PG (ref 26.6–33)
MCHC RBC AUTO-ENTMCNC: 33.1 G/DL (ref 31.5–35.7)
MCV RBC AUTO: 91.7 FL (ref 79–97)
MONOCYTES # BLD AUTO: 0.62 10*3/MM3 (ref 0.1–0.9)
MONOCYTES NFR BLD AUTO: 10 % (ref 5–12)
NEUTROPHILS NFR BLD AUTO: 3.4 10*3/MM3 (ref 1.7–7)
NEUTROPHILS NFR BLD AUTO: 54.7 % (ref 42.7–76)
NRBC BLD AUTO-RTO: 0 /100 WBC (ref 0–0.2)
PLATELET # BLD AUTO: 274 10*3/MM3 (ref 140–450)
PMV BLD AUTO: 10.1 FL (ref 6–12)
POTASSIUM SERPL-SCNC: 4 MMOL/L (ref 3.5–5.2)
PROT SERPL-MCNC: 7 G/DL (ref 6–8.5)
RBC # BLD AUTO: 5.2 10*6/MM3 (ref 4.14–5.8)
SODIUM SERPL-SCNC: 137 MMOL/L (ref 136–145)
TRIGL SERPL-MCNC: 77 MG/DL (ref 0–150)
VLDLC SERPL-MCNC: 15 MG/DL (ref 5–40)
WBC NRBC COR # BLD AUTO: 6.2 10*3/MM3 (ref 3.4–10.8)

## 2024-09-12 PROCEDURE — 85025 COMPLETE CBC W/AUTO DIFF WBC: CPT | Performed by: PREVENTIVE MEDICINE

## 2024-09-12 PROCEDURE — 82306 VITAMIN D 25 HYDROXY: CPT | Performed by: PREVENTIVE MEDICINE

## 2024-09-12 PROCEDURE — 80053 COMPREHEN METABOLIC PANEL: CPT | Performed by: PREVENTIVE MEDICINE

## 2024-09-12 PROCEDURE — G0432 EIA HIV-1/HIV-2 SCREEN: HCPCS | Performed by: PREVENTIVE MEDICINE

## 2024-09-12 PROCEDURE — 80061 LIPID PANEL: CPT | Performed by: PREVENTIVE MEDICINE

## 2024-09-13 ENCOUNTER — TELEPHONE (OUTPATIENT)
Dept: FAMILY MEDICINE CLINIC | Facility: CLINIC | Age: 44
End: 2024-09-13
Payer: COMMERCIAL

## 2025-01-20 RX ORDER — FINASTERIDE 1 MG/1
TABLET, FILM COATED ORAL
COMMUNITY
Start: 2024-09-09 | End: 2025-01-23

## 2025-01-22 PROBLEM — F33.1 MAJOR DEPRESSIVE DISORDER, RECURRENT, MODERATE: Status: ACTIVE | Noted: 2025-01-22

## 2025-01-22 NOTE — PATIENT INSTRUCTIONS
Health Maintenance Due   Topic Date Due    COLORECTAL CANCER SCREENING  Never done    TDAP/TD VACCINES (1 - Tdap) Never done    HEPATITIS C SCREENING  Never done    ANNUAL PHYSICAL  01/22/2025    You are due for adacel Tdap vaccination. (provides protection against tetanus, diptheria and whooping cough) Please  get the immunization at your local pharmacy at your earliest convenience.  Please click on the link for more information about this vaccine.    https://www.cdc.gov/vaccines/vpd/dtap-tdap-td/public/index.html      12 hour fast for labs

## 2025-01-23 ENCOUNTER — OFFICE VISIT (OUTPATIENT)
Dept: FAMILY MEDICINE CLINIC | Facility: CLINIC | Age: 45
End: 2025-01-23
Payer: COMMERCIAL

## 2025-01-23 VITALS
SYSTOLIC BLOOD PRESSURE: 120 MMHG | WEIGHT: 151.8 LBS | DIASTOLIC BLOOD PRESSURE: 82 MMHG | OXYGEN SATURATION: 96 % | HEART RATE: 74 BPM | TEMPERATURE: 97.9 F | HEIGHT: 71 IN | BODY MASS INDEX: 21.25 KG/M2

## 2025-01-23 DIAGNOSIS — K40.90 NON-RECURRENT UNILATERAL INGUINAL HERNIA WITHOUT OBSTRUCTION OR GANGRENE: ICD-10-CM

## 2025-01-23 DIAGNOSIS — Z12.11 SCREENING FOR COLON CANCER: ICD-10-CM

## 2025-01-23 DIAGNOSIS — F33.1 MAJOR DEPRESSIVE DISORDER, RECURRENT, MODERATE: ICD-10-CM

## 2025-01-23 DIAGNOSIS — Z00.01 ENCOUNTER FOR ANNUAL GENERAL MEDICAL EXAMINATION WITH ABNORMAL FINDINGS IN ADULT: Primary | ICD-10-CM

## 2025-01-23 DIAGNOSIS — Z12.11 SCREENING FOR MALIGNANT NEOPLASM OF COLON: ICD-10-CM

## 2025-01-23 DIAGNOSIS — Z11.59 ENCOUNTER FOR HEPATITIS C SCREENING TEST FOR LOW RISK PATIENT: ICD-10-CM

## 2025-01-23 PROCEDURE — 99396 PREV VISIT EST AGE 40-64: CPT | Performed by: PREVENTIVE MEDICINE

## 2025-01-23 PROCEDURE — 99214 OFFICE O/P EST MOD 30 MIN: CPT | Performed by: PREVENTIVE MEDICINE

## 2025-01-23 RX ORDER — MINOXIDIL 10 MG/1
5 TABLET ORAL DAILY
Qty: 90 TABLET | Refills: 3 | Status: SHIPPED | OUTPATIENT
Start: 2025-01-23 | End: 2025-01-23

## 2025-01-23 RX ORDER — MINOXIDIL 10 MG/1
5 TABLET ORAL DAILY
Qty: 90 TABLET | Refills: 3 | Status: SHIPPED | OUTPATIENT
Start: 2025-01-23

## 2025-01-23 RX ORDER — DUTASTERIDE 0.5 MG/1
0.5 CAPSULE, LIQUID FILLED ORAL DAILY
Qty: 90 CAPSULE | Refills: 3 | Status: SHIPPED | OUTPATIENT
Start: 2025-01-23

## 2025-01-23 NOTE — H&P (VIEW-ONLY)
General Surgery History and Physical      Referring Provider: No ref. provider found    Chief Complaint:    Left inguinal hernia    History of Present Illness:    David Gonzalez is a 45 y.o.     History of Present Illness  The patient is a 45-year-old male here for evaluation of a left inguinal hernia.    He was previously evaluated by Dr. Ramos, who identified an inguinal hernia. He experienced discomfort in the affected area during the winter, which has since improved. He reports that the size of the hernia fluctuates, with no associated pain. However, he has experienced abdominal discomfort on three separate occasions, initially attribandbut notes that he can manually reduce the hernia, which significantly alleviates his abdominal pain.     Past Medical History:   History reviewed. No pertinent past medical history.     Past Surgical History:    History reviewed. No pertinent surgical history.    Family History:    Family History   Problem Relation Age of Onset    Heart attack Father     Supraventricular tachycardia Brother      Social History:    Social History     Socioeconomic History    Marital status:    Tobacco Use    Smoking status: Former     Current packs/day: 0.00     Average packs/day: 0.5 packs/day for 11.2 years (5.6 ttl pk-yrs)     Types: Cigarettes     Start date: 2002     Quit date: 2013     Years since quittin.8     Passive exposure: Past    Smokeless tobacco: Never    Tobacco comments:     Quit cold turkey 2013   Vaping Use    Vaping status: Never Used   Substance and Sexual Activity    Alcohol use: Not Currently     Comment: Heavy drinker from  to     Drug use: Not Currently     Types: Marijuana     Comment: Enjoy small amounts at night    Sexual activity: Not Currently     Partners: Female     Birth control/protection: None     Allergies:   No Known Allergies    Medications:     Current Outpatient Medications:     dutasteride (AVODART) 0.5 MG capsule, Take  1 capsule by mouth Daily., Disp: 90 capsule, Rfl: 3    minoxidil (LONITEN) 10 MG tablet, Take 0.5 tablets by mouth Daily., Disp: 90 tablet, Rfl: 3    Radiology/Endoscopy:    None applicable    Labs:    Recent labs reviewed    Review of Systems:   As noted above in HPI    Physical Exam:   No acute distress, alert  Nonlabored respirations  Abdomen soft, nontender, nondistended  Reducible left inguinal hernia with obvious swelling  Extremities warm and well-perfused with no gross deformities    Assessment and Plan:  David Gonzalez is a 45 y.o.   Assessment & Plan  45-year-old male with symptomatic left inguinal hernia.    -The hernia appears to be symptomatic and has been progressively enlarging over time surgical repair is recommended  - A minimally invasive robotic approach for hernia repair was recommended; potential risks, including bleeding, infection, injury to the spermatic cord structures, injury to intra-abdominal structures, hernia recurrence  -The patient expressed understanding of everything discussed and would like to proceed with surgical repair; we discussed bilateral repair if inguinal hernias identified on the right at the time of surgery     Janice Mason MD  General Surgery    Patient or patient representative verbalized consent for the use of Ambient Listening during the visit with  Janice Mason MD for chart documentation. 1/27/2025  13:13 EST

## 2025-01-23 NOTE — PROGRESS NOTES
Subjective   David Gonzalez is a 45 y.o. male presents for   Chief Complaint   Patient presents with    Annual Exam       Health Maintenance Due   Topic Date Due    COLORECTAL CANCER SCREENING  Never done    TDAP/TD VACCINES (1 - Tdap) Never done    HEPATITIS C SCREENING  Never done    ANNUAL PHYSICAL  01/22/2025   Patient presents today for his annual wellness and age-specific physical and was advised to wear sunscreen and a seatbelt.    Patient is also here because he would like some medicines to help with hair growth and to discuss hernia repair.  Medications were provided and he has decided to go ahead with repair of his inguinal hernia.    History of Present Illness   History of Present Illness  The patient is a 45-year-old male who is here today for his annual wellness exam, screening for colon cancer, screening for hepatitis C, major depressive disorder, nonrecurrent unilateral inguinal hernia without obstruction, and body mass index of 21.    He reports no significant health issues since his last visit. He has been diagnosed with an inguinal hernia and has consulted with Dr. Bishnu Ramos. He was advised to schedule a follow-up appointment if he experiences discomfort. He reached out to the doctor in late December and has an appointment scheduled on Monday. He attributes the pain to an abdominal strain caused by excessive sneezing and coughing during a recent cold or flu episode. The pain has since resolved. He is considering surgical repair of the hernia but is uncertain about the recovery period. His occupation involves working in a research lab, primarily sitting at a computer, with minimal standing or lifting requirements. He recalls an episode of waking up with gas or bloating, which he believes was related to the hernia. The symptoms subsided after approximately 10 hours.    He reports no changes in hearing or vision. He had an eye examination approximately a week ago, which did not reveal any  significant vision problems. He underwent an Optos test, which was normal. He is currently wearing glasses and plans to switch to contact lenses, with the left lens for nearsightedness and the right lens for farsightedness. He has been using trial contact lenses and plans to order more soon.    He reports no difficulty swallowing or digesting food. He visited the dentist last year for a cleaning and had a few superficial cavities filled. He has a dental appointment scheduled for February.    He reports no hemoptysis or wheezing. He has a history of smoking but quit 15 years ago. He reports no changes in bowel habits, dark stools, or painful urination. He also reports no issues with erections or ejaculation.    He has started using topical minoxidil and is considering switching to oral minoxidil for convenience. He has been taking finasteride since September, which he reports as well-tolerated, although he notes some side effects, including less strong erections. He does not report any difficulty achieving erections. He is interested in trying dutasteride as an alternative to finasteride due to its perceived higher efficacy and lower side effect profile.    He has never been screened for hepatitis C and has never donated blood. He is not currently taking any medications for depression.    SOCIAL HISTORY  He works in a research lab, primarily sitting at a computer, with minimal standing or lifting requirements. He has a history of smoking but quit 15 years ago.    FAMILY HISTORY  He reports no family history of colon cancer that he is aware of.    ALLERGIES  The patient has no known allergies.    MEDICATIONS  Current: minoxidil, finasteride    IMMUNIZATIONS  He received his COVID-19 vaccine on 09/09/2024. He has had the influenza vaccine.    Vitals:    01/23/25 1252 01/23/25 1255   BP: 128/82 120/82   Pulse: 74 74   Temp: 97.9 °F (36.6 °C)    SpO2: 98% 96%   Weight: 68.9 kg (151 lb 12.8 oz)    Height: 180.3 cm  "(70.98\")      Body mass index is 21.18 kg/m².    Current Outpatient Medications on File Prior to Visit   Medication Sig Dispense Refill    [DISCONTINUED] finasteride (PROPECIA) 1 MG tablet        No current facility-administered medications on file prior to visit.       The following portions of the patient's history were reviewed and updated as appropriate: allergies, current medications, past family history, past medical history, past social history, past surgical history, and problem list.    Review of Systems   Gastrointestinal:         Inguinal hernia   Skin:         Hair loss   Psychiatric/Behavioral:  Negative for depressed mood.        Objective   Physical Exam  Vitals reviewed.   Constitutional:       General: He is not in acute distress.     Appearance: Normal appearance. He is well-developed. He is not ill-appearing or toxic-appearing.   HENT:      Head: Normocephalic and atraumatic.      Right Ear: Tympanic membrane, ear canal and external ear normal.      Left Ear: Tympanic membrane, ear canal and external ear normal.      Nose: Nose normal.      Mouth/Throat:      Mouth: Mucous membranes are moist.      Pharynx: No posterior oropharyngeal erythema.   Eyes:      Extraocular Movements: Extraocular movements intact.      Conjunctiva/sclera: Conjunctivae normal.      Pupils: Pupils are equal, round, and reactive to light.   Neck:      Vascular: No carotid bruit.   Cardiovascular:      Rate and Rhythm: Normal rate and regular rhythm.      Heart sounds: Normal heart sounds.   Pulmonary:      Effort: Pulmonary effort is normal.      Breath sounds: Normal breath sounds.   Abdominal:      General: Bowel sounds are normal. There is no distension.      Palpations: Abdomen is soft. There is no mass.      Tenderness: There is no abdominal tenderness. There is no right CVA tenderness or left CVA tenderness.      Hernia: A hernia is present.   Musculoskeletal:         General: Normal range of motion.      Cervical " back: Neck supple. No tenderness.   Lymphadenopathy:      Cervical: No cervical adenopathy.   Skin:     General: Skin is warm.      Comments: Male pattern baldness   Neurological:      General: No focal deficit present.      Mental Status: He is alert and oriented to person, place, and time.   Psychiatric:         Mood and Affect: Mood normal.         Behavior: Behavior normal.       Physical Exam  Oral exam was performed.  Carotid sounds are normal.  Lung sounds are normal.  Heart rate is 72, rhythm is normal. One irregular beat was heard.    Vital Signs  Body mass index is 21.    PHQ-9 Total Score:    Results           Assessment & Plan   Diagnoses and all orders for this visit:    1. Encounter for annual general medical examination with abnormal findings in adult (Primary)    2. Screening for colon cancer  -     Cologuard - Stool, Per Rectum; Future    3. Encounter for hepatitis C screening test for low risk patient  -     Hepatitis C Antibody; Future    4. Major depressive disorder, recurrent, moderate  -     TSH Rfx On Abnormal To Free T4; Future  -     Vitamin B12; Future  -     Magnesium; Future    5. Non-recurrent unilateral inguinal hernia without obstruction or gangrene    6. Body mass index (BMI) of 21.0-21.9 in adult    7. Screening for malignant neoplasm of colon  -     Cologuard - Stool, Per Rectum; Future    Other orders  -     Discontinue: minoxidil (LONITEN) 10 MG tablet; Take 0.5 tablets by mouth Daily.  Dispense: 90 tablet; Refill: 3  -     dutasteride (AVODART) 0.5 MG capsule; Take 1 capsule by mouth Daily.  Dispense: 90 capsule; Refill: 3  -     minoxidil (LONITEN) 10 MG tablet; Take 0.5 tablets by mouth Daily.  Dispense: 90 tablet; Refill: 3      Assessment & Plan  1. Nonrecurrent unilateral inguinal hernia without obstruction.  He has an appointment scheduled with Dr. Celis on Monday for further evaluation and potential repair. The hernia is currently painless, but he experienced discomfort  after an abdominal strain due to coughing and sneezing from a cold. He is advised to proceed with the repair at his convenience, considering the minimal downtime required for his job.    2. Major depressive disorder.  He is not currently on any medications for depression. Labs will be ordered to check magnesium, thyroid, and vitamin B12 levels due to the depression. These tests will be conducted concurrently with his preoperative labs.    3. Health maintenance.  His body mass index is within the normal range. He is due for colon cancer screening. He has received his COVID-19 vaccine and influenza vaccine. He does not qualify for Prevnar or RSV vaccines. A Cologuard test will be ordered for 6 weeks from now, with the possibility of transitioning to Cologuard Plus in March or April. A 12-hour fasting lab will be ordered, which can be completed during his preoperative labs. He is advised to monitor for symptoms of dizziness or lightheadedness and report any such occurrences.    4. Alopecia.  A prescription for minoxidil 5 mg, 90 tablets with 3 refills, will be sent to [facility]. A prescription for dutasteride 0.5 mg will also be sent to the same pharmacy.    Follow-up  The patient will follow up in 6 months.    Patient Instructions     Health Maintenance Due   Topic Date Due    COLORECTAL CANCER SCREENING  Never done    TDAP/TD VACCINES (1 - Tdap) Never done    HEPATITIS C SCREENING  Never done    ANNUAL PHYSICAL  01/22/2025    You are due for adacel Tdap vaccination. (provides protection against tetanus, diptheria and whooping cough) Please  get the immunization at your local pharmacy at your earliest convenience.  Please click on the link for more information about this vaccine.    https://www.cdc.gov/vaccines/vpd/dtap-tdap-td/public/index.html      12 hour fast for labs         Patient or patient representative verbalized consent for the use of Ambient Listening during the visit with  Aylin Zamarripa  MD Dillon for chart documentation. 1/23/2025  17:32 EST

## 2025-01-27 ENCOUNTER — OFFICE VISIT (OUTPATIENT)
Dept: SURGERY | Facility: CLINIC | Age: 45
End: 2025-01-27
Payer: COMMERCIAL

## 2025-01-27 VITALS
DIASTOLIC BLOOD PRESSURE: 85 MMHG | SYSTOLIC BLOOD PRESSURE: 136 MMHG | HEART RATE: 87 BPM | OXYGEN SATURATION: 98 % | HEIGHT: 71 IN | BODY MASS INDEX: 22.52 KG/M2 | WEIGHT: 160.9 LBS | TEMPERATURE: 97.8 F

## 2025-01-27 DIAGNOSIS — K40.90 LEFT INGUINAL HERNIA: Primary | ICD-10-CM

## 2025-01-27 PROCEDURE — 99214 OFFICE O/P EST MOD 30 MIN: CPT | Performed by: SURGERY

## 2025-01-27 RX ORDER — SODIUM CHLORIDE 0.9 % (FLUSH) 0.9 %
3-10 SYRINGE (ML) INJECTION AS NEEDED
OUTPATIENT
Start: 2025-01-27

## 2025-01-27 RX ORDER — SODIUM CHLORIDE 0.9 % (FLUSH) 0.9 %
3 SYRINGE (ML) INJECTION EVERY 12 HOURS SCHEDULED
OUTPATIENT
Start: 2025-01-27

## 2025-01-27 RX ORDER — SODIUM CHLORIDE 9 MG/ML
40 INJECTION, SOLUTION INTRAVENOUS AS NEEDED
OUTPATIENT
Start: 2025-01-27

## 2025-01-27 RX ORDER — SODIUM CHLORIDE 9 MG/ML
100 INJECTION, SOLUTION INTRAVENOUS CONTINUOUS
OUTPATIENT
Start: 2025-01-27 | End: 2025-01-28

## 2025-01-30 ENCOUNTER — LAB (OUTPATIENT)
Dept: LAB | Facility: HOSPITAL | Age: 45
End: 2025-01-30
Payer: COMMERCIAL

## 2025-01-30 LAB
BASOPHILS # BLD AUTO: 0.05 10*3/MM3 (ref 0–0.2)
BASOPHILS NFR BLD AUTO: 0.8 % (ref 0–1.5)
DEPRECATED RDW RBC AUTO: 42.6 FL (ref 37–54)
EOSINOPHIL # BLD AUTO: 0.43 10*3/MM3 (ref 0–0.4)
EOSINOPHIL NFR BLD AUTO: 6.8 % (ref 0.3–6.2)
ERYTHROCYTE [DISTWIDTH] IN BLOOD BY AUTOMATED COUNT: 12.7 % (ref 12.3–15.4)
HBA1C MFR BLD: 5.56 % (ref 4.8–5.6)
HCT VFR BLD AUTO: 45 % (ref 37.5–51)
HGB BLD-MCNC: 14.8 G/DL (ref 13–17.7)
IMM GRANULOCYTES # BLD AUTO: 0.01 10*3/MM3 (ref 0–0.05)
IMM GRANULOCYTES NFR BLD AUTO: 0.2 % (ref 0–0.5)
LYMPHOCYTES # BLD AUTO: 1.87 10*3/MM3 (ref 0.7–3.1)
LYMPHOCYTES NFR BLD AUTO: 29.7 % (ref 19.6–45.3)
MCH RBC QN AUTO: 30.2 PG (ref 26.6–33)
MCHC RBC AUTO-ENTMCNC: 32.9 G/DL (ref 31.5–35.7)
MCV RBC AUTO: 91.8 FL (ref 79–97)
MONOCYTES # BLD AUTO: 0.58 10*3/MM3 (ref 0.1–0.9)
MONOCYTES NFR BLD AUTO: 9.2 % (ref 5–12)
NEUTROPHILS NFR BLD AUTO: 3.36 10*3/MM3 (ref 1.7–7)
NEUTROPHILS NFR BLD AUTO: 53.3 % (ref 42.7–76)
NRBC BLD AUTO-RTO: 0 /100 WBC (ref 0–0.2)
PLATELET # BLD AUTO: 283 10*3/MM3 (ref 140–450)
PMV BLD AUTO: 9.5 FL (ref 6–12)
RBC # BLD AUTO: 4.9 10*6/MM3 (ref 4.14–5.8)
WBC NRBC COR # BLD AUTO: 6.3 10*3/MM3 (ref 3.4–10.8)

## 2025-01-30 PROCEDURE — 83036 HEMOGLOBIN GLYCOSYLATED A1C: CPT | Performed by: SURGERY

## 2025-01-30 PROCEDURE — 85025 COMPLETE CBC W/AUTO DIFF WBC: CPT | Performed by: SURGERY

## 2025-01-30 PROCEDURE — 36415 COLL VENOUS BLD VENIPUNCTURE: CPT | Performed by: SURGERY

## 2025-02-06 ENCOUNTER — ANESTHESIA EVENT (OUTPATIENT)
Dept: PERIOP | Facility: HOSPITAL | Age: 45
End: 2025-02-06
Payer: COMMERCIAL

## 2025-02-06 ENCOUNTER — ANESTHESIA (OUTPATIENT)
Dept: PERIOP | Facility: HOSPITAL | Age: 45
End: 2025-02-06
Payer: COMMERCIAL

## 2025-02-06 ENCOUNTER — HOSPITAL ENCOUNTER (OUTPATIENT)
Facility: HOSPITAL | Age: 45
Setting detail: HOSPITAL OUTPATIENT SURGERY
Discharge: HOME | End: 2025-02-06
Attending: SURGERY | Admitting: SURGERY
Payer: COMMERCIAL

## 2025-02-06 VITALS
HEART RATE: 61 BPM | RESPIRATION RATE: 10 BRPM | HEIGHT: 70 IN | BODY MASS INDEX: 21.82 KG/M2 | TEMPERATURE: 98.9 F | WEIGHT: 152.4 LBS | SYSTOLIC BLOOD PRESSURE: 127 MMHG | OXYGEN SATURATION: 94 % | DIASTOLIC BLOOD PRESSURE: 83 MMHG

## 2025-02-06 DIAGNOSIS — K40.90 LEFT INGUINAL HERNIA: ICD-10-CM

## 2025-02-06 PROCEDURE — 25010000002 BUPIVACAINE (PF) 0.25 % SOLUTION: Performed by: SURGERY

## 2025-02-06 PROCEDURE — 25010000002 HYDROMORPHONE 1 MG/ML SOLUTION: Performed by: NURSE ANESTHETIST, CERTIFIED REGISTERED

## 2025-02-06 PROCEDURE — 25010000002 ONDANSETRON PER 1 MG: Performed by: NURSE ANESTHETIST, CERTIFIED REGISTERED

## 2025-02-06 PROCEDURE — 25010000002 LIDOCAINE PF 1% 1 % SOLUTION: Performed by: NURSE ANESTHETIST, CERTIFIED REGISTERED

## 2025-02-06 PROCEDURE — C1781 MESH (IMPLANTABLE): HCPCS | Performed by: SURGERY

## 2025-02-06 PROCEDURE — 25010000002 DEXAMETHASONE PER 1 MG: Performed by: NURSE ANESTHETIST, CERTIFIED REGISTERED

## 2025-02-06 PROCEDURE — 25010000002 CEFAZOLIN PER 500 MG: Performed by: SURGERY

## 2025-02-06 PROCEDURE — 25010000002 FENTANYL CITRATE (PF) 100 MCG/2ML SOLUTION: Performed by: NURSE ANESTHETIST, CERTIFIED REGISTERED

## 2025-02-06 PROCEDURE — 25010000002 PROPOFOL 10 MG/ML EMULSION: Performed by: NURSE ANESTHETIST, CERTIFIED REGISTERED

## 2025-02-06 PROCEDURE — 25010000002 DIPHENHYDRAMINE PER 50 MG: Performed by: NURSE ANESTHETIST, CERTIFIED REGISTERED

## 2025-02-06 PROCEDURE — 25810000003 LACTATED RINGERS PER 1000 ML: Performed by: SURGERY

## 2025-02-06 PROCEDURE — 25010000002 SUGAMMADEX 200 MG/2ML SOLUTION: Performed by: NURSE ANESTHETIST, CERTIFIED REGISTERED

## 2025-02-06 PROCEDURE — 49650 LAP ING HERNIA REPAIR INIT: CPT

## 2025-02-06 PROCEDURE — 25810000003 LACTATED RINGERS PER 1000 ML: Performed by: NURSE ANESTHETIST, CERTIFIED REGISTERED

## 2025-02-06 PROCEDURE — 25010000002 MIDAZOLAM PER 1 MG: Performed by: NURSE ANESTHETIST, CERTIFIED REGISTERED

## 2025-02-06 PROCEDURE — 49650 LAP ING HERNIA REPAIR INIT: CPT | Performed by: SURGERY

## 2025-02-06 DEVICE — ABSORBABLE WOUND CLOSURE DEVICE
Type: IMPLANTABLE DEVICE | Site: INGUINAL | Status: FUNCTIONAL
Brand: V-LOC 180

## 2025-02-06 DEVICE — 3DMAX MESH, 10.8 CM X 16.0 CM (4.3" X 6.3"), LEFT, LARGE
Type: IMPLANTABLE DEVICE | Site: ABDOMEN | Status: FUNCTIONAL
Brand: 3DMAX

## 2025-02-06 RX ORDER — HYDROCODONE BITARTRATE AND ACETAMINOPHEN 5; 325 MG/1; MG/1
1 TABLET ORAL EVERY 6 HOURS PRN
Qty: 15 TABLET | Refills: 0 | Status: SHIPPED | OUTPATIENT
Start: 2025-02-06 | End: 2025-02-11

## 2025-02-06 RX ORDER — SODIUM CHLORIDE 0.9 % (FLUSH) 0.9 %
3-10 SYRINGE (ML) INJECTION AS NEEDED
Status: DISCONTINUED | OUTPATIENT
Start: 2025-02-06 | End: 2025-02-06 | Stop reason: HOSPADM

## 2025-02-06 RX ORDER — OXYCODONE HYDROCHLORIDE 5 MG/1
5 TABLET ORAL ONCE AS NEEDED
Status: DISCONTINUED | OUTPATIENT
Start: 2025-02-06 | End: 2025-02-06 | Stop reason: HOSPADM

## 2025-02-06 RX ORDER — DEXMEDETOMIDINE HYDROCHLORIDE 100 UG/ML
INJECTION, SOLUTION INTRAVENOUS AS NEEDED
Status: DISCONTINUED | OUTPATIENT
Start: 2025-02-06 | End: 2025-02-06 | Stop reason: SURG

## 2025-02-06 RX ORDER — NALOXONE HCL 0.4 MG/ML
0.4 VIAL (ML) INJECTION AS NEEDED
Status: DISCONTINUED | OUTPATIENT
Start: 2025-02-06 | End: 2025-02-06 | Stop reason: HOSPADM

## 2025-02-06 RX ORDER — LIDOCAINE HYDROCHLORIDE 10 MG/ML
INJECTION, SOLUTION EPIDURAL; INFILTRATION; INTRACAUDAL; PERINEURAL AS NEEDED
Status: DISCONTINUED | OUTPATIENT
Start: 2025-02-06 | End: 2025-02-06 | Stop reason: SURG

## 2025-02-06 RX ORDER — HYDRALAZINE HYDROCHLORIDE 20 MG/ML
5 INJECTION INTRAMUSCULAR; INTRAVENOUS
Status: DISCONTINUED | OUTPATIENT
Start: 2025-02-06 | End: 2025-02-06 | Stop reason: HOSPADM

## 2025-02-06 RX ORDER — SODIUM CHLORIDE 0.9 % (FLUSH) 0.9 %
10 SYRINGE (ML) INJECTION AS NEEDED
Status: DISCONTINUED | OUTPATIENT
Start: 2025-02-06 | End: 2025-02-06 | Stop reason: HOSPADM

## 2025-02-06 RX ORDER — LIDOCAINE HYDROCHLORIDE 10 MG/ML
0.5 INJECTION, SOLUTION EPIDURAL; INFILTRATION; INTRACAUDAL; PERINEURAL ONCE AS NEEDED
Status: DISCONTINUED | OUTPATIENT
Start: 2025-02-06 | End: 2025-02-06 | Stop reason: HOSPADM

## 2025-02-06 RX ORDER — SODIUM CHLORIDE 9 MG/ML
100 INJECTION, SOLUTION INTRAVENOUS CONTINUOUS
Status: DISCONTINUED | OUTPATIENT
Start: 2025-02-06 | End: 2025-02-06 | Stop reason: HOSPADM

## 2025-02-06 RX ORDER — BUPIVACAINE HYDROCHLORIDE 2.5 MG/ML
INJECTION, SOLUTION EPIDURAL; INFILTRATION; INTRACAUDAL AS NEEDED
Status: DISCONTINUED | OUTPATIENT
Start: 2025-02-06 | End: 2025-02-06 | Stop reason: HOSPADM

## 2025-02-06 RX ORDER — SODIUM CHLORIDE, SODIUM LACTATE, POTASSIUM CHLORIDE, CALCIUM CHLORIDE 600; 310; 30; 20 MG/100ML; MG/100ML; MG/100ML; MG/100ML
INJECTION, SOLUTION INTRAVENOUS CONTINUOUS PRN
Status: DISCONTINUED | OUTPATIENT
Start: 2025-02-06 | End: 2025-02-06 | Stop reason: SURG

## 2025-02-06 RX ORDER — DIPHENHYDRAMINE HYDROCHLORIDE 50 MG/ML
12.5 INJECTION INTRAMUSCULAR; INTRAVENOUS
Status: DISCONTINUED | OUTPATIENT
Start: 2025-02-06 | End: 2025-02-06 | Stop reason: HOSPADM

## 2025-02-06 RX ORDER — DEXAMETHASONE SODIUM PHOSPHATE 4 MG/ML
INJECTION, SOLUTION INTRA-ARTICULAR; INTRALESIONAL; INTRAMUSCULAR; INTRAVENOUS; SOFT TISSUE AS NEEDED
Status: DISCONTINUED | OUTPATIENT
Start: 2025-02-06 | End: 2025-02-06 | Stop reason: SURG

## 2025-02-06 RX ORDER — ONDANSETRON 2 MG/ML
4 INJECTION INTRAMUSCULAR; INTRAVENOUS ONCE AS NEEDED
Status: COMPLETED | OUTPATIENT
Start: 2025-02-06 | End: 2025-02-06

## 2025-02-06 RX ORDER — OXYCODONE HYDROCHLORIDE 5 MG/1
10 TABLET ORAL EVERY 4 HOURS PRN
Status: DISCONTINUED | OUTPATIENT
Start: 2025-02-06 | End: 2025-02-06 | Stop reason: HOSPADM

## 2025-02-06 RX ORDER — LABETALOL HYDROCHLORIDE 5 MG/ML
5 INJECTION, SOLUTION INTRAVENOUS
Status: DISCONTINUED | OUTPATIENT
Start: 2025-02-06 | End: 2025-02-06 | Stop reason: HOSPADM

## 2025-02-06 RX ORDER — SODIUM CHLORIDE, SODIUM LACTATE, POTASSIUM CHLORIDE, CALCIUM CHLORIDE 600; 310; 30; 20 MG/100ML; MG/100ML; MG/100ML; MG/100ML
20 INJECTION, SOLUTION INTRAVENOUS ONCE
Status: COMPLETED | OUTPATIENT
Start: 2025-02-06 | End: 2025-02-06

## 2025-02-06 RX ORDER — SODIUM CHLORIDE 0.9 % (FLUSH) 0.9 %
3 SYRINGE (ML) INJECTION EVERY 12 HOURS SCHEDULED
Status: DISCONTINUED | OUTPATIENT
Start: 2025-02-06 | End: 2025-02-06 | Stop reason: HOSPADM

## 2025-02-06 RX ORDER — MIDAZOLAM HYDROCHLORIDE 1 MG/ML
INJECTION, SOLUTION INTRAMUSCULAR; INTRAVENOUS AS NEEDED
Status: DISCONTINUED | OUTPATIENT
Start: 2025-02-06 | End: 2025-02-06 | Stop reason: SURG

## 2025-02-06 RX ORDER — MEPERIDINE HYDROCHLORIDE 25 MG/ML
12.5 INJECTION INTRAMUSCULAR; INTRAVENOUS; SUBCUTANEOUS
Status: DISCONTINUED | OUTPATIENT
Start: 2025-02-06 | End: 2025-02-06 | Stop reason: HOSPADM

## 2025-02-06 RX ORDER — SODIUM CHLORIDE 9 MG/ML
40 INJECTION, SOLUTION INTRAVENOUS AS NEEDED
Status: DISCONTINUED | OUTPATIENT
Start: 2025-02-06 | End: 2025-02-06 | Stop reason: HOSPADM

## 2025-02-06 RX ORDER — PROPOFOL 10 MG/ML
VIAL (ML) INTRAVENOUS AS NEEDED
Status: DISCONTINUED | OUTPATIENT
Start: 2025-02-06 | End: 2025-02-06 | Stop reason: SURG

## 2025-02-06 RX ORDER — ONDANSETRON 2 MG/ML
INJECTION INTRAMUSCULAR; INTRAVENOUS AS NEEDED
Status: DISCONTINUED | OUTPATIENT
Start: 2025-02-06 | End: 2025-02-06 | Stop reason: SURG

## 2025-02-06 RX ORDER — FENTANYL CITRATE 50 UG/ML
INJECTION, SOLUTION INTRAMUSCULAR; INTRAVENOUS AS NEEDED
Status: DISCONTINUED | OUTPATIENT
Start: 2025-02-06 | End: 2025-02-06 | Stop reason: SURG

## 2025-02-06 RX ORDER — IPRATROPIUM BROMIDE AND ALBUTEROL SULFATE 2.5; .5 MG/3ML; MG/3ML
3 SOLUTION RESPIRATORY (INHALATION) ONCE AS NEEDED
Status: DISCONTINUED | OUTPATIENT
Start: 2025-02-06 | End: 2025-02-06 | Stop reason: HOSPADM

## 2025-02-06 RX ORDER — ROCURONIUM BROMIDE 10 MG/ML
INJECTION, SOLUTION INTRAVENOUS AS NEEDED
Status: DISCONTINUED | OUTPATIENT
Start: 2025-02-06 | End: 2025-02-06 | Stop reason: SURG

## 2025-02-06 RX ADMIN — PROPOFOL 200 MG: 10 INJECTION, EMULSION INTRAVENOUS at 16:51

## 2025-02-06 RX ADMIN — CEFAZOLIN 2000 MG: 2 INJECTION, POWDER, FOR SOLUTION INTRAMUSCULAR; INTRAVENOUS at 16:42

## 2025-02-06 RX ADMIN — DEXMEDETOMIDINE HYDROCHLORIDE 6 MCG: 100 INJECTION, SOLUTION, CONCENTRATE INTRAVENOUS at 18:17

## 2025-02-06 RX ADMIN — SODIUM CHLORIDE, POTASSIUM CHLORIDE, SODIUM LACTATE AND CALCIUM CHLORIDE 20 ML/HR: 600; 310; 30; 20 INJECTION, SOLUTION INTRAVENOUS at 16:42

## 2025-02-06 RX ADMIN — SUGAMMADEX 150 MG: 100 INJECTION, SOLUTION INTRAVENOUS at 18:19

## 2025-02-06 RX ADMIN — HYDROMORPHONE HYDROCHLORIDE 0.5 MG: 1 INJECTION, SOLUTION INTRAMUSCULAR; INTRAVENOUS; SUBCUTANEOUS at 18:25

## 2025-02-06 RX ADMIN — HYDROMORPHONE HYDROCHLORIDE 0.5 MG: 1 INJECTION, SOLUTION INTRAMUSCULAR; INTRAVENOUS; SUBCUTANEOUS at 18:19

## 2025-02-06 RX ADMIN — ROCURONIUM BROMIDE 20 MG: 10 INJECTION INTRAVENOUS at 17:45

## 2025-02-06 RX ADMIN — OXYCODONE HYDROCHLORIDE 10 MG: 5 TABLET ORAL at 18:56

## 2025-02-06 RX ADMIN — SODIUM CHLORIDE, SODIUM LACTATE, POTASSIUM CHLORIDE, AND CALCIUM CHLORIDE: .6; .31; .03; .02 INJECTION, SOLUTION INTRAVENOUS at 16:40

## 2025-02-06 RX ADMIN — HYDROMORPHONE HYDROCHLORIDE 0.5 MG: 1 INJECTION, SOLUTION INTRAMUSCULAR; INTRAVENOUS; SUBCUTANEOUS at 18:56

## 2025-02-06 RX ADMIN — ONDANSETRON 4 MG: 2 INJECTION INTRAMUSCULAR; INTRAVENOUS at 20:14

## 2025-02-06 RX ADMIN — MIDAZOLAM 2 MG: 1 INJECTION INTRAMUSCULAR; INTRAVENOUS at 16:44

## 2025-02-06 RX ADMIN — ROCURONIUM BROMIDE 50 MG: 10 INJECTION INTRAVENOUS at 16:51

## 2025-02-06 RX ADMIN — DEXMEDETOMIDINE HYDROCHLORIDE 6 MCG: 100 INJECTION, SOLUTION, CONCENTRATE INTRAVENOUS at 18:20

## 2025-02-06 RX ADMIN — DEXMEDETOMIDINE HYDROCHLORIDE 6 MCG: 100 INJECTION, SOLUTION, CONCENTRATE INTRAVENOUS at 18:14

## 2025-02-06 RX ADMIN — FENTANYL CITRATE 50 MCG: 50 INJECTION, SOLUTION INTRAMUSCULAR; INTRAVENOUS at 17:20

## 2025-02-06 RX ADMIN — DEXAMETHASONE SODIUM PHOSPHATE 8 MG: 4 INJECTION, SOLUTION INTRAMUSCULAR; INTRAVENOUS at 17:00

## 2025-02-06 RX ADMIN — FENTANYL CITRATE 50 MCG: 50 INJECTION, SOLUTION INTRAMUSCULAR; INTRAVENOUS at 16:51

## 2025-02-06 RX ADMIN — DIPHENHYDRAMINE HYDROCHLORIDE 12.5 MG: 50 INJECTION, SOLUTION INTRAMUSCULAR; INTRAVENOUS at 20:40

## 2025-02-06 RX ADMIN — ONDANSETRON 4 MG: 2 INJECTION INTRAMUSCULAR; INTRAVENOUS at 18:15

## 2025-02-06 RX ADMIN — LIDOCAINE HYDROCHLORIDE 50 MG: 10 INJECTION, SOLUTION EPIDURAL; INFILTRATION; INTRACAUDAL; PERINEURAL at 16:51

## 2025-02-06 NOTE — DISCHARGE INSTRUCTIONS
Call the office to schedule followup appointment approx 2 wks postop  Keep incisions dry for first 24-48 hrs then may remove overlying bandages but leave white steristrips in place  May shower soap and water after bandages are removed but no baths/soaking x 2 weeks  No lifting >10-15 lbs x 6 wks  Over the counter stool softener twice daily until off narcotics and having regular bowel movements  Milk of magnesia as needed if still constipated with stool softeners   Do not work or drive while on narcotics  Bruising and swelling in the groin/scrotum/penis are normal; may use ice to the area for swelling or discomfort  Recommend compression to the left groin region for the next 2 weeks

## 2025-02-06 NOTE — ANESTHESIA PREPROCEDURE EVALUATION
Anesthesia Evaluation     Patient summary reviewed and Nursing notes reviewed   NPO Solid Status: > 8 hours  NPO Liquid Status: > 8 hours           Airway   Mallampati: II  TM distance: >3 FB  Neck ROM: full  No difficulty expected  Dental - normal exam     Pulmonary - normal exam    breath sounds clear to auscultation  (+) a smoker Former,  Cardiovascular - negative cardio ROS and normal exam  Exercise tolerance: unable to assess    ECG reviewed  Rhythm: regular  Rate: normal        Neuro/Psych  (+) psychiatric history Anxiety  GI/Hepatic/Renal/Endo - negative ROS     Musculoskeletal (-) negative ROS    Abdominal  - normal exam   Substance History - negative use     OB/GYN negative ob/gyn ROS         Other - negative ROS                   Anesthesia Plan    ASA 2     general     (GETA)  intravenous induction     Anesthetic plan, risks, benefits, and alternatives have been provided, discussed and informed consent has been obtained with: patient.    CODE STATUS:

## 2025-02-06 NOTE — OP NOTE
Operative Report    Patient Name:  David Gonzalez  YOB: 1980    Date of Surgery:  2/6/2025    Pre-op Diagnosis:   Left inguinal hernia [K40.90]       Post-op Diagnosis:   Left inguinal hernia [K40.90]    Procedure(s):  Robotic assisted laparoscopic left inguinal hernia repair with mesh    Staff:  Surgeon(s):  Janice Mason MD    Circulator: Lety Hare RN; Essence Puentes RN  Scrub Person: Isamar Guerra; Whitley Patino  Assistant: Anna Venegas CSA  was responsible for performing the following activities: Closing, Placing Dressing, Held/Positioned Camera, and bedside assist robotics case  and their skilled assistance was necessary for the success of this case.    Anesthesia: General    Estimated Blood Loss:  10mL    Implants:    Implant Name Type Inv. Item Serial No.  Lot No. LRB No. Used Action   MESH 3DMAX 4X6IN LG LT - [id number] Implant MESH 3DMAX 4X6IN LG LT  [employer]  (DIV [employer]) [id number] Left 1 Implanted     Specimen:          None    Findings: Large chronic indirect left inguinal hernia with cord lipoma.  Spermatic cord structures densely adherent to hernia sac.  Spermatic cord structures identified and preserved.  Right groin intact.     Complications: None immediate    Clinical Indications: The patient is a 45 year old male who was seen in the clinic for symptomatic left inguinal hernia. On exam the patient had a left inguinal hernia. Given that the inguinal hernia was symptomatic the patient was interested in surgical repair. The surgery along with the associated risks (including but not limited to bleeding, infection, hernia recurrence, injury to spermatic cord structures), benefits, and alternatives were explained to the patient. The patient expressed understanding and wished to proceed with surgery. Informed consent was obtained.    Description of Procedure: The patient was brought to the operating room and placed in the supine position. Bilateral  sequential compression stockings placed on the lower extremities. The patient was induced and intubated by the Anesthesia service. The patient's left arm was tucked. Perioperative antibiotics were administered. The patient's abdomen and bilateral groins were then prepped and draped in the usual sterile fashion. A time out was performed.    After confirming with anesthesia that an orogastric tube was in place and to suction we made a small stab incision in the left upper quadrant at Guzman's point. A veress needle was then carefully placed and a water drop test performed indicating we were likely intra-abdominal. Insufflation was initiated and a low opening pressure reassured us we were intra-abdominal. We insufflated to a pressure of 15 mmHg. We placed an 8mm robotic trocar several centimeters above the umbilicus. We then introduced the camera and inspected the abdomen to ensure we had not caused any injuries with the placement of the trocar or veress needled and no injuries were noted. The veress needle was removed and the insertion site was verified to be hemostatic. Two 8mm robotic trocars were then placed on either side of the umbilicus slightly above the level of the umbilicus. The patient was placed in trendelenburg position.    The robot was then docked, a bipolar grasper was placed in the left port, and andrew with electrocautery was placed in the right port.  At this point I sat down at the console to begin the dissection. An inguinal hernia was seen on the left and the other side was intact. A peritoneal flap was created from the level of the anterior superior iliac spine to the median umbilical ligament with andrew and electrocautery. We first dissected medially with a combination of blunt and sharp dissection and identified Dmitri's ligament. We dissected out the preperitoneal space and reduced the hernia sac, dissecting down to the iliopubic tract using a combination of blunt and sharp dissection. The  spermatic vessels and vas deferens were identified and dissected away from the hernia sac. At this point we were ready to place the mesh and the area was verified to be adequately hemostatic. We introduced a large left Bard 3D Max hernia mesh. The mesh was positioned in place making sure to have adequate medial coverage. The mesh laid flat and adequately covered the defect. The mesh was secured in place to Dmitri's ligament with the 3-0 prolene suture. The mesh was secured at 2 spots superiorly on either side of the inferior epigastric vessels using 3-0 prolene suture. The peritoneal flap was closed with 3-0 v-lock suture in a running fashion. The hernia repair was inspected and the mesh appeared to lay flat and was well covered with peritoneum. All of the needles were removed from the abdomen.    The two 8mm lateral trocars were removed under direct visualization and were found to be hemostatic. The patient was leveled, pneumoperitoneum was released, and the medial trocar was removed. The incisions were all closed with 4-0 vicryl in a subcuticular fashion, cleaned, and dressed with sterile dressings.    The patient tolerated the procedure well.  He was awakened and extubated by anesthesia and then transported to the recovery room in stable condition. At the completion of the case all needle, instrument, and sponge counts were correct.     Janice Mason MD     Date: 2/6/2025  Time: 18:50 EST    This note was created using Dragon Voice Recognition software.

## 2025-02-06 NOTE — ANESTHESIA PROCEDURE NOTES
Airway  Urgency: elective    Date/Time: 2/6/2025 4:55 PM  End Time:2/6/2025 4:55 PM  Airway not difficult    General Information and Staff    Patient location during procedure: OR  SRNA: Robbie Lopez SRNA  Indications and Patient Condition  Indications for airway management: airway protection    Preoxygenated: yes  MILS maintained throughout  Mask difficulty assessment: 2 - vent by mask + OA or adjuvant +/- NMBA    Final Airway Details  Final airway type: endotracheal airway      Successful airway: ETT  Cuffed: yes   Successful intubation technique: direct laryngoscopy  Facilitating devices/methods: intubating stylet  Endotracheal tube insertion site: oral  Blade: Lauren  Blade size: 4  ETT size (mm): 7.5  Cormack-Lehane Classification: grade IIa - partial view of glottis  Placement verified by: chest auscultation and capnometry   Cuff volume (mL): 8  Measured from: lips  ETT/EBT  to lips (cm): 23  Number of attempts at approach: 1  Assessment: lips, teeth, and gum same as pre-op and atraumatic intubation

## 2025-02-07 NOTE — ANESTHESIA POSTPROCEDURE EVALUATION
Patient: David Gonzalez    Procedure Summary       Date: 02/06/25 Room / Location: [facility] OR  / [facility] MAIN OR    Anesthesia Start: 1646 Anesthesia Stop: 1828    Procedure: Robotic assisted laparoscopic left inguinal hernia repair with mesh (Left: Abdomen) Diagnosis:       Left inguinal hernia      (Left inguinal hernia [K40.90])    Surgeons: Janice Mason MD Provider: Laron López MD    Anesthesia Type: general ASA Status: 2            Anesthesia Type: general    Vitals  Vitals Value Taken Time   /79 02/06/25 1906   Temp 98 °F (36.7 °C) 02/06/25 1828   Pulse 72 02/06/25 1907   Resp 11 02/06/25 1856   SpO2 92 % 02/06/25 1907   Vitals shown include unfiled device data.        Post Anesthesia Care and Evaluation    Patient location during evaluation: PACU  Patient participation: complete - patient participated  Level of consciousness: awake  Pain scale: See nurse's notes for pain score.  Pain management: adequate    Airway patency: patent  Anesthetic complications: No anesthetic complications  PONV Status: none  Cardiovascular status: acceptable  Respiratory status: acceptable and spontaneous ventilation  Hydration status: acceptable    Comments: Patient seen and examined postoperatively; vital signs stable; SpO2 greater than or equal to 90%; cardiopulmonary status stable; nausea/vomiting adequately controlled; pain adequately controlled; no apparent anesthesia complications; patient discharged from anesthesia care when discharge criteria were met

## 2025-02-10 ENCOUNTER — TELEPHONE (OUTPATIENT)
Dept: SURGERY | Facility: CLINIC | Age: 45
End: 2025-02-10
Payer: COMMERCIAL

## 2025-02-10 NOTE — TELEPHONE ENCOUNTER
Call placed to patient to follow up after surgery and to discuss follow up appointment with Elvira Juan PA-C on 2/20/2025. Left message on machine  and encouraged to call with any questions/ concerns related to surgery.     Okay for HUB to Relay.

## 2025-02-20 ENCOUNTER — TELEPHONE (OUTPATIENT)
Dept: SURGERY | Facility: CLINIC | Age: 45
End: 2025-02-20

## 2025-02-20 NOTE — TELEPHONE ENCOUNTER
"Caller: David Gonzalez \"Lyle\"    Relationship:  Self    Best call back number: [phone] (home)       PATIENT CALLED REQUESTING TO CANCEL SAME DAY APPT.    Did the patient call AFTER the start time of their scheduled appointment?  []YES  [x]NO    Was the patient's appointment rescheduled? []YES  [x]NO    Any additional information: RESCHEDULED TO 3/3/25 AT 9A    "

## 2025-02-24 ENCOUNTER — TELEPHONE (OUTPATIENT)
Dept: FAMILY MEDICINE CLINIC | Facility: CLINIC | Age: 45
End: 2025-02-24
Payer: COMMERCIAL

## 2025-02-24 NOTE — TELEPHONE ENCOUNTER
HUB TO RELAY----- Message from Aylin Carranza sent at 2/24/2025  7:32 AM EST -----  Cologuard negative.  Repeat 3 years unless bowels change

## 2025-03-03 ENCOUNTER — OFFICE VISIT (OUTPATIENT)
Dept: SURGERY | Facility: CLINIC | Age: 45
End: 2025-03-03
Payer: COMMERCIAL

## 2025-03-03 VITALS
DIASTOLIC BLOOD PRESSURE: 90 MMHG | SYSTOLIC BLOOD PRESSURE: 113 MMHG | WEIGHT: 165 LBS | HEART RATE: 79 BPM | OXYGEN SATURATION: 98 % | TEMPERATURE: 98.4 F | BODY MASS INDEX: 23.1 KG/M2 | HEIGHT: 71 IN

## 2025-03-03 DIAGNOSIS — Z09 POSTOP CHECK: Primary | ICD-10-CM

## 2025-03-03 PROCEDURE — 99024 POSTOP FOLLOW-UP VISIT: CPT | Performed by: SURGERY

## 2025-03-03 NOTE — PROGRESS NOTES
"General Surgery Post-Operative Follow Up Note     Subjective:  David Gonzalez is a 45 y.o. year old male here for post-operative follow up.  He reports he is doing well.  Denies any pain.  He does note some mild discomfort with certain movements which is overall improving.  Denies any swelling.  He has no concerns at this time.    Procedure:    Robotic assisted laparoscopic left inguinal hernia repair with mesh, 2/6/2025    Vitals:  /90 (BP Location: Left arm, Patient Position: Sitting, Cuff Size: Adult)   Pulse 79   Temp 98.4 °F (36.9 °C) (Infrared)   Ht 180.3 cm (71\")   Wt 74.8 kg (165 lb)   SpO2 98%   BMI 23.01 kg/m²      Physical Exam:   NAD, alert  Nonlabored respirations  Abdomen soft, NT/ND, no incisional hernias appreciated, incisions healing well  Left groin with hernia repair intact, no evidence of recurrence or seroma    Assessment and Plan:  David Gonzalez is a 45 y.o. year old male status post robotic assisted laparoscopic left inguinal hernia repair with mesh, doing well.    -Continue with lifting restrictions for a total of 6 weeks postop  -Followup on PRN basis    Janice Mason M.D.  General Surgery  "

## (undated) DEVICE — BLADELESS OBTURATOR: Brand: WECK VISTA

## (undated) DEVICE — CANNULA SEAL

## (undated) DEVICE — SUT VIC FS2 4/0 27IN J422H

## (undated) DEVICE — BLANKT WARM UPPR/BDY ARM/OUT 57X196CM

## (undated) DEVICE — ANTIBACTERIAL UNDYED BRAIDED (POLYGLACTIN 910), SYNTHETIC ABSORBABLE SUTURE: Brand: COATED VICRYL

## (undated) DEVICE — GAUZE,SPONGE,4"X4",16PLY,XRAY,STRL,LF: Brand: MEDLINE

## (undated) DEVICE — THE STERILE CAMERA HANDLE COVER IS FOR USE WITH THE STERIS SURGICAL LIGHTING AND VISUALIZATION SYSTEMS.

## (undated) DEVICE — BNDG ADHS PLSTC 1X3IN LF

## (undated) DEVICE — SYRINGE, LOCKING, 10CC, STERILE: Brand: BABY GORILLA/GORILLA PLATING SYSTEM

## (undated) DEVICE — SYR LL TP 10ML STRL

## (undated) DEVICE — 40580 - THE PINK PAD - ADVANCED TRENDELENBURG POSITIONING KIT: Brand: 40580 - THE PINK PAD - ADVANCED TRENDELENBURG POSITIONING KIT

## (undated) DEVICE — SUT PROLN 3/0 SH D/A 36IN 8522H

## (undated) DEVICE — SHEET,DRAPE,53X77,STERILE: Brand: MEDLINE

## (undated) DEVICE — 3M™ STERI-STRIP™ REINFORCED ADHESIVE SKIN CLOSURES, R1547, 1/2 IN X 4 IN (12 MM X 100 MM), 6 STRIPS/ENVELOPE: Brand: 3M™ STERI-STRIP™

## (undated) DEVICE — UNDRGLV SURG BIOGEL PIMICROINDICATOR SYNTH SZ8 LF STRL

## (undated) DEVICE — GLOVE,SURG,SENSICARE SLT,LF,PF,6: Brand: MEDLINE

## (undated) DEVICE — ARM DRAPE

## (undated) DEVICE — COLUMN DRAPE

## (undated) DEVICE — GENERAL LAPAROSCOPY CDS: Brand: MEDLINE INDUSTRIES, INC.

## (undated) DEVICE — SEAL

## (undated) DEVICE — MARKER SKIN W/RULER DUAL: Brand: MEDLINE INDUSTRIES, INC.

## (undated) DEVICE — ENDOPATH PNEUMONEEDLE INSUFFLATION NEEDLES WITH LUER LOCK CONNECTORS 120MM: Brand: ENDOPATH

## (undated) DEVICE — DRAPE SHEET ULTRAGARD: Brand: MEDLINE

## (undated) DEVICE — TBG INSUFF MALE L/L W 12MM CON: Brand: MEDLINE INDUSTRIES, INC.

## (undated) DEVICE — THE STERILE LIGHT HANDLE COVER IS USED WITH STERIS SURGICAL LIGHTING AND VISUALIZATION SYSTEMS.

## (undated) DEVICE — KT SURG TURNOVER 050

## (undated) DEVICE — SOLUTION,WATER,IRRIGATION,1000ML,STERILE: Brand: MEDLINE

## (undated) DEVICE — TIP COVER ACCESSORY

## (undated) DEVICE — GLV SURG SENSICARE POLYISPRN W/ALOE PF LF 6.5 GRN STRL

## (undated) DEVICE — LAPAROVUE VISIBILITY SYSTEM LAPAROSCOPIC SOLUTIONS: Brand: LAPAROVUE